# Patient Record
Sex: FEMALE | Race: WHITE | NOT HISPANIC OR LATINO | Employment: OTHER | URBAN - METROPOLITAN AREA
[De-identification: names, ages, dates, MRNs, and addresses within clinical notes are randomized per-mention and may not be internally consistent; named-entity substitution may affect disease eponyms.]

---

## 2017-07-01 ENCOUNTER — HOSPITAL ENCOUNTER (EMERGENCY)
Facility: HOSPITAL | Age: 82
Discharge: HOME | End: 2017-07-01
Attending: EMERGENCY MEDICINE
Payer: MEDICARE

## 2017-07-01 VITALS
RESPIRATION RATE: 18 BRPM | DIASTOLIC BLOOD PRESSURE: 100 MMHG | HEART RATE: 112 BPM | TEMPERATURE: 98 F | SYSTOLIC BLOOD PRESSURE: 227 MMHG | OXYGEN SATURATION: 95 %

## 2017-07-01 DIAGNOSIS — L40.9 PSORIASIS: Primary | ICD-10-CM

## 2017-07-01 PROCEDURE — 99283 EMERGENCY DEPT VISIT LOW MDM: CPT

## 2017-07-01 RX ORDER — CLINDAMYCIN HYDROCHLORIDE 150 MG/1
300 CAPSULE ORAL 4 TIMES DAILY
Qty: 56 CAPSULE | Refills: 0 | Status: SHIPPED | OUTPATIENT
Start: 2017-07-01 | End: 2017-07-08

## 2017-07-01 RX ORDER — TRIAMCINOLONE ACETONIDE 5 MG/G
OINTMENT TOPICAL 2 TIMES DAILY
Qty: 15 G | Refills: 0 | Status: ON HOLD | OUTPATIENT
Start: 2017-07-01 | End: 2020-01-14 | Stop reason: HOSPADM

## 2017-07-01 NOTE — ED NOTES
Pt reports swelling and psoriasis to Left hand that started yesterday, pt denies chest pain or sob, pt has psoriasis to lt palm and and swelling is noted to lt hand with redness, pt son in room with pt, pt awake alert in no acute distress

## 2017-07-01 NOTE — ED PROVIDER NOTES
Encounter Date: 7/1/2017       History     Chief Complaint   Patient presents with    Psoriasis     States onset with psoriasis to L palm and back of hand, with swelling since yesterday.      Patient presents with cracking and swelling to her left hand.  She does have a history of psoriasis of her hands.  She reports her left hand has a lot of scaly and now is mildly swollen and tender on the dorsal aspect.  She reports no fever chills nausea vomiting.  She has no pain in the hand but it itches.  No aggravating or relieving factors.  She has not been able to get in to see her dermatologist .              Review of patient's allergies indicates:   Allergen Reactions    Codeine Nausea Only    Darvon [propoxyphene] Nausea Only     Past Medical History:   Diagnosis Date    Diabetes mellitus     Hypertension      History reviewed. No pertinent surgical history.  History reviewed. No pertinent family history.  Social History   Substance Use Topics    Smoking status: Never Smoker    Smokeless tobacco: Never Used    Alcohol use Not on file     Review of Systems   Constitutional: Negative for fever.   HENT: Negative for sore throat.    Respiratory: Negative for shortness of breath.    Cardiovascular: Negative for chest pain.   Gastrointestinal: Negative for nausea.   Genitourinary: Negative for dysuria.   Musculoskeletal: Positive for arthralgias and joint swelling. Negative for back pain and gait problem.   Skin: Positive for rash.   Neurological: Negative for weakness.   Hematological: Does not bruise/bleed easily.       Physical Exam     Initial Vitals [07/01/17 1454]   BP Pulse Resp Temp SpO2   (!) 227/100 (!) 112 18 97.6 °F (36.4 °C) 95 %      MAP       142.33         Physical Exam    Nursing note and vitals reviewed.  Constitutional: She appears well-developed and well-nourished.   HENT:   Head: Normocephalic and atraumatic.   Eyes: Conjunctivae and EOM are normal.   Neck: Normal range of motion. Neck supple.    Cardiovascular: Normal rate, regular rhythm, normal heart sounds and intact distal pulses.   Pulmonary/Chest: Breath sounds normal. No respiratory distress. She has no wheezes. She has no rhonchi. She has no rales.   Abdominal: Soft. Bowel sounds are normal.   Musculoskeletal: Normal range of motion.   Neurological: She is alert and oriented to person, place, and time. She has normal strength.   Skin: Skin is warm and dry.   Patient with marked scaling and dryness to her left hand.  The dorsum of her hand is a little bit swollen and warm to touch.  There is no obvious abscess or fluctuance noted.  She has full range of motion of her digits. CR less than 2 seconds.   Psychiatric: She has a normal mood and affect. Thought content normal.         ED Course   Procedures  Labs Reviewed - No data to display          Medical Decision Making:   ED Management:  Patient reports her psoriasis has only gotten this bad twice in the past.  I will start antibiotics empirically I'm not sure position is a severe case of psoriasis versus a early infection with source from the cracks in her hands.  I will also place her on triamcinolone cream and psoriasin over-the-counter                   ED Course     Clinical Impression:   The encounter diagnosis was Psoriasis.    Disposition:   Disposition: Discharged  Condition: Stable                        Isabel Atkinson Do, MD  07/01/17 5746

## 2020-01-10 ENCOUNTER — HOSPITAL ENCOUNTER (INPATIENT)
Facility: HOSPITAL | Age: 85
LOS: 5 days | Discharge: NURSING, REHAB OR LONG-TERM CARE | DRG: 602 | End: 2020-01-15
Attending: EMERGENCY MEDICINE | Admitting: HOSPITALIST
Payer: MEDICARE

## 2020-01-10 DIAGNOSIS — L97.811: ICD-10-CM

## 2020-01-10 DIAGNOSIS — I83.018 VENOUS STASIS ULCER OF OTHER PART OF RIGHT LOWER LEG, UNSPECIFIED ULCER STAGE, UNSPECIFIED WHETHER VARICOSE VEINS PRESENT: Primary | ICD-10-CM

## 2020-01-10 DIAGNOSIS — L97.909 LEG ULCER: ICD-10-CM

## 2020-01-10 DIAGNOSIS — M25.561 RIGHT KNEE PAIN: ICD-10-CM

## 2020-01-10 DIAGNOSIS — R60.0 LOWER EXTREMITY EDEMA: ICD-10-CM

## 2020-01-10 DIAGNOSIS — I73.9 PAD (PERIPHERAL ARTERY DISEASE): ICD-10-CM

## 2020-01-10 DIAGNOSIS — L97.819 VENOUS STASIS ULCER OF OTHER PART OF RIGHT LOWER LEG, UNSPECIFIED ULCER STAGE, UNSPECIFIED WHETHER VARICOSE VEINS PRESENT: Primary | ICD-10-CM

## 2020-01-10 DIAGNOSIS — L03.115 CELLULITIS OF RIGHT LOWER LEG: ICD-10-CM

## 2020-01-10 DIAGNOSIS — E11.649 TYPE 2 DIABETES MELLITUS WITH HYPOGLYCEMIA WITHOUT COMA, WITHOUT LONG-TERM CURRENT USE OF INSULIN: ICD-10-CM

## 2020-01-10 DIAGNOSIS — I50.31 ACUTE DIASTOLIC HEART FAILURE: ICD-10-CM

## 2020-01-10 DIAGNOSIS — I10 HYPERTENSION: ICD-10-CM

## 2020-01-10 PROBLEM — E78.5 HLD (HYPERLIPIDEMIA): Status: ACTIVE | Noted: 2020-01-10

## 2020-01-10 PROBLEM — I83.009 VENOUS STASIS ULCER: Status: ACTIVE | Noted: 2020-01-10

## 2020-01-10 PROBLEM — N17.9 AKI (ACUTE KIDNEY INJURY): Status: ACTIVE | Noted: 2020-01-10

## 2020-01-10 PROBLEM — R53.81 DEBILITY: Status: ACTIVE | Noted: 2020-01-10

## 2020-01-10 PROBLEM — E87.5 HYPERKALEMIA: Status: ACTIVE | Noted: 2020-01-10

## 2020-01-10 PROBLEM — M15.9 PRIMARY OSTEOARTHRITIS INVOLVING MULTIPLE JOINTS: Status: ACTIVE | Noted: 2020-01-10

## 2020-01-10 PROBLEM — Z66 DNR (DO NOT RESUSCITATE): Status: ACTIVE | Noted: 2020-01-10

## 2020-01-10 PROBLEM — M17.11 OSTEOARTHRITIS OF RIGHT KNEE: Status: ACTIVE | Noted: 2020-01-10

## 2020-01-10 LAB
ALBUMIN SERPL BCP-MCNC: 3.5 G/DL (ref 3.5–5.2)
ALP SERPL-CCNC: 101 U/L (ref 55–135)
ALT SERPL W/O P-5'-P-CCNC: 18 U/L (ref 10–44)
ANION GAP SERPL CALC-SCNC: 10 MMOL/L (ref 8–16)
ANION GAP SERPL CALC-SCNC: 8 MMOL/L (ref 8–16)
AST SERPL-CCNC: 21 U/L (ref 10–40)
BACTERIA #/AREA URNS AUTO: NORMAL /HPF
BASOPHILS # BLD AUTO: 0.02 K/UL (ref 0–0.2)
BASOPHILS NFR BLD: 0.2 % (ref 0–1.9)
BILIRUB SERPL-MCNC: 0.3 MG/DL (ref 0.1–1)
BILIRUB UR QL STRIP: NEGATIVE
BNP SERPL-MCNC: 110 PG/ML (ref 0–99)
BUN SERPL-MCNC: 37 MG/DL (ref 6–30)
BUN SERPL-MCNC: 40 MG/DL (ref 10–30)
BUN SERPL-MCNC: 43 MG/DL (ref 10–30)
CALCIUM SERPL-MCNC: 9.3 MG/DL (ref 8.7–10.5)
CALCIUM SERPL-MCNC: 9.4 MG/DL (ref 8.7–10.5)
CHLORIDE SERPL-SCNC: 109 MMOL/L (ref 95–110)
CHLORIDE SERPL-SCNC: 110 MMOL/L (ref 95–110)
CHLORIDE SERPL-SCNC: 113 MMOL/L (ref 95–110)
CLARITY UR REFRACT.AUTO: CLEAR
CO2 SERPL-SCNC: 20 MMOL/L (ref 23–29)
CO2 SERPL-SCNC: 21 MMOL/L (ref 23–29)
COLOR UR AUTO: ABNORMAL
CREAT SERPL-MCNC: 1.5 MG/DL (ref 0.5–1.4)
CREAT SERPL-MCNC: 1.5 MG/DL (ref 0.5–1.4)
CREAT SERPL-MCNC: 1.6 MG/DL (ref 0.5–1.4)
CRP SERPL-MCNC: 5.6 MG/L (ref 0–8.2)
DIFFERENTIAL METHOD: ABNORMAL
EOSINOPHIL # BLD AUTO: 0.1 K/UL (ref 0–0.5)
EOSINOPHIL NFR BLD: 0.4 % (ref 0–8)
ERYTHROCYTE [DISTWIDTH] IN BLOOD BY AUTOMATED COUNT: 15.5 % (ref 11.5–14.5)
ERYTHROCYTE [SEDIMENTATION RATE] IN BLOOD BY WESTERGREN METHOD: 64 MM/HR (ref 0–36)
EST. GFR  (AFRICAN AMERICAN): 31.8 ML/MIN/1.73 M^2
EST. GFR  (AFRICAN AMERICAN): 34.4 ML/MIN/1.73 M^2
EST. GFR  (NON AFRICAN AMERICAN): 27.6 ML/MIN/1.73 M^2
EST. GFR  (NON AFRICAN AMERICAN): 29.8 ML/MIN/1.73 M^2
ESTIMATED AVG GLUCOSE: 123 MG/DL (ref 68–131)
GLUCOSE SERPL-MCNC: 72 MG/DL (ref 70–110)
GLUCOSE SERPL-MCNC: 79 MG/DL (ref 70–110)
GLUCOSE SERPL-MCNC: 90 MG/DL (ref 70–110)
GLUCOSE UR QL STRIP: NEGATIVE
HBA1C MFR BLD HPLC: 5.9 % (ref 4–5.6)
HCT VFR BLD AUTO: 37.4 % (ref 37–48.5)
HCT VFR BLD CALC: 33 %PCV (ref 36–54)
HGB BLD-MCNC: 11.3 G/DL (ref 12–16)
HGB UR QL STRIP: ABNORMAL
IMM GRANULOCYTES # BLD AUTO: 0.11 K/UL (ref 0–0.04)
IMM GRANULOCYTES NFR BLD AUTO: 0.8 % (ref 0–0.5)
KETONES UR QL STRIP: NEGATIVE
LACTATE SERPL-SCNC: 0.8 MMOL/L (ref 0.5–2.2)
LACTATE SERPL-SCNC: 0.9 MMOL/L (ref 0.5–2.2)
LEUKOCYTE ESTERASE UR QL STRIP: NEGATIVE
LYMPHOCYTES # BLD AUTO: 0.9 K/UL (ref 1–4.8)
LYMPHOCYTES NFR BLD: 6.9 % (ref 18–48)
MCH RBC QN AUTO: 31 PG (ref 27–31)
MCHC RBC AUTO-ENTMCNC: 30.2 G/DL (ref 32–36)
MCV RBC AUTO: 103 FL (ref 82–98)
MICROSCOPIC COMMENT: NORMAL
MONOCYTES # BLD AUTO: 0.6 K/UL (ref 0.3–1)
MONOCYTES NFR BLD: 4.8 % (ref 4–15)
NEUTROPHILS # BLD AUTO: 11.3 K/UL (ref 1.8–7.7)
NEUTROPHILS NFR BLD: 86.9 % (ref 38–73)
NITRITE UR QL STRIP: NEGATIVE
NRBC BLD-RTO: 0 /100 WBC
PH UR STRIP: 5 [PH] (ref 5–8)
PLATELET # BLD AUTO: 357 K/UL (ref 150–350)
PMV BLD AUTO: 9.2 FL (ref 9.2–12.9)
POC IONIZED CALCIUM: 1.13 MMOL/L (ref 1.06–1.42)
POC TCO2 (MEASURED): 19 MMOL/L (ref 23–29)
POCT GLUCOSE: 111 MG/DL (ref 70–110)
POCT GLUCOSE: 164 MG/DL (ref 70–110)
POCT GLUCOSE: 81 MG/DL (ref 70–110)
POTASSIUM BLD-SCNC: 5.5 MMOL/L (ref 3.5–5.1)
POTASSIUM SERPL-SCNC: 5.6 MMOL/L (ref 3.5–5.1)
POTASSIUM SERPL-SCNC: 5.7 MMOL/L (ref 3.5–5.1)
PROT SERPL-MCNC: 7.7 G/DL (ref 6–8.4)
PROT UR QL STRIP: NEGATIVE
RBC # BLD AUTO: 3.64 M/UL (ref 4–5.4)
RBC #/AREA URNS AUTO: 2 /HPF (ref 0–4)
SAMPLE: ABNORMAL
SODIUM BLD-SCNC: 139 MMOL/L (ref 136–145)
SODIUM SERPL-SCNC: 138 MMOL/L (ref 136–145)
SODIUM SERPL-SCNC: 140 MMOL/L (ref 136–145)
SP GR UR STRIP: 1.01 (ref 1–1.03)
URATE SERPL-MCNC: 6.3 MG/DL (ref 2.4–5.7)
URN SPEC COLLECT METH UR: ABNORMAL
WBC # BLD AUTO: 12.97 K/UL (ref 3.9–12.7)
WBC #/AREA URNS AUTO: 1 /HPF (ref 0–5)

## 2020-01-10 PROCEDURE — 83036 HEMOGLOBIN GLYCOSYLATED A1C: CPT

## 2020-01-10 PROCEDURE — 11000001 HC ACUTE MED/SURG PRIVATE ROOM

## 2020-01-10 PROCEDURE — 86140 C-REACTIVE PROTEIN: CPT

## 2020-01-10 PROCEDURE — 93005 ELECTROCARDIOGRAM TRACING: CPT

## 2020-01-10 PROCEDURE — 81001 URINALYSIS AUTO W/SCOPE: CPT

## 2020-01-10 PROCEDURE — 99223 PR INITIAL HOSPITAL CARE,LEVL III: ICD-10-PCS | Mod: AI,,, | Performed by: HOSPITALIST

## 2020-01-10 PROCEDURE — 83605 ASSAY OF LACTIC ACID: CPT | Mod: 91

## 2020-01-10 PROCEDURE — 82962 GLUCOSE BLOOD TEST: CPT

## 2020-01-10 PROCEDURE — 83880 ASSAY OF NATRIURETIC PEPTIDE: CPT

## 2020-01-10 PROCEDURE — 94761 N-INVAS EAR/PLS OXIMETRY MLT: CPT

## 2020-01-10 PROCEDURE — 80053 COMPREHEN METABOLIC PANEL: CPT

## 2020-01-10 PROCEDURE — 99285 EMERGENCY DEPT VISIT HI MDM: CPT | Mod: ,,, | Performed by: EMERGENCY MEDICINE

## 2020-01-10 PROCEDURE — 63600175 PHARM REV CODE 636 W HCPCS: Performed by: HOSPITALIST

## 2020-01-10 PROCEDURE — 85025 COMPLETE CBC W/AUTO DIFF WBC: CPT

## 2020-01-10 PROCEDURE — 25000003 PHARM REV CODE 250: Performed by: HOSPITALIST

## 2020-01-10 PROCEDURE — 84550 ASSAY OF BLOOD/URIC ACID: CPT

## 2020-01-10 PROCEDURE — 80048 BASIC METABOLIC PNL TOTAL CA: CPT

## 2020-01-10 PROCEDURE — 99285 EMERGENCY DEPT VISIT HI MDM: CPT | Mod: 25

## 2020-01-10 PROCEDURE — 63600175 PHARM REV CODE 636 W HCPCS: Performed by: PHYSICIAN ASSISTANT

## 2020-01-10 PROCEDURE — 25000003 PHARM REV CODE 250: Performed by: PHYSICIAN ASSISTANT

## 2020-01-10 PROCEDURE — 80047 BASIC METABLC PNL IONIZED CA: CPT

## 2020-01-10 PROCEDURE — 99223 1ST HOSP IP/OBS HIGH 75: CPT | Mod: AI,,, | Performed by: HOSPITALIST

## 2020-01-10 PROCEDURE — 96365 THER/PROPH/DIAG IV INF INIT: CPT | Mod: XS

## 2020-01-10 PROCEDURE — 87040 BLOOD CULTURE FOR BACTERIA: CPT

## 2020-01-10 PROCEDURE — 85652 RBC SED RATE AUTOMATED: CPT

## 2020-01-10 PROCEDURE — 93010 EKG 12-LEAD: ICD-10-PCS | Mod: ,,, | Performed by: INTERNAL MEDICINE

## 2020-01-10 PROCEDURE — 93010 ELECTROCARDIOGRAM REPORT: CPT | Mod: ,,, | Performed by: INTERNAL MEDICINE

## 2020-01-10 PROCEDURE — 99285 PR EMERGENCY DEPT VISIT,LEVEL V: ICD-10-PCS | Mod: ,,, | Performed by: EMERGENCY MEDICINE

## 2020-01-10 RX ORDER — SODIUM CHLORIDE 0.9 % (FLUSH) 0.9 %
10 SYRINGE (ML) INJECTION
Status: DISCONTINUED | OUTPATIENT
Start: 2020-01-10 | End: 2020-01-15 | Stop reason: HOSPADM

## 2020-01-10 RX ORDER — GLUCAGON 1 MG
1 KIT INJECTION
Status: DISCONTINUED | OUTPATIENT
Start: 2020-01-10 | End: 2020-01-15 | Stop reason: HOSPADM

## 2020-01-10 RX ORDER — LORAZEPAM 0.5 MG/1
0.5 TABLET ORAL ONCE
Status: COMPLETED | OUTPATIENT
Start: 2020-01-10 | End: 2020-01-10

## 2020-01-10 RX ORDER — ONDANSETRON 8 MG/1
8 TABLET, ORALLY DISINTEGRATING ORAL EVERY 8 HOURS PRN
Status: DISCONTINUED | OUTPATIENT
Start: 2020-01-10 | End: 2020-01-15 | Stop reason: HOSPADM

## 2020-01-10 RX ORDER — IBUPROFEN 200 MG
24 TABLET ORAL
Status: DISCONTINUED | OUTPATIENT
Start: 2020-01-10 | End: 2020-01-15 | Stop reason: HOSPADM

## 2020-01-10 RX ORDER — HEPARIN SODIUM 5000 [USP'U]/ML
5000 INJECTION, SOLUTION INTRAVENOUS; SUBCUTANEOUS EVERY 12 HOURS
Status: DISCONTINUED | OUTPATIENT
Start: 2020-01-10 | End: 2020-01-15 | Stop reason: HOSPADM

## 2020-01-10 RX ORDER — AMLODIPINE BESYLATE 5 MG/1
5 TABLET ORAL DAILY
Status: DISCONTINUED | OUTPATIENT
Start: 2020-01-10 | End: 2020-01-12

## 2020-01-10 RX ORDER — INSULIN ASPART 100 [IU]/ML
0-5 INJECTION, SOLUTION INTRAVENOUS; SUBCUTANEOUS
Status: DISCONTINUED | OUTPATIENT
Start: 2020-01-10 | End: 2020-01-15 | Stop reason: HOSPADM

## 2020-01-10 RX ORDER — TALC
6 POWDER (GRAM) TOPICAL NIGHTLY PRN
Status: DISCONTINUED | OUTPATIENT
Start: 2020-01-10 | End: 2020-01-15 | Stop reason: HOSPADM

## 2020-01-10 RX ORDER — ACETAMINOPHEN 325 MG/1
650 TABLET ORAL EVERY 4 HOURS PRN
Status: DISCONTINUED | OUTPATIENT
Start: 2020-01-10 | End: 2020-01-15 | Stop reason: HOSPADM

## 2020-01-10 RX ORDER — ACETAMINOPHEN 500 MG
1000 TABLET ORAL EVERY 8 HOURS PRN
Status: DISCONTINUED | OUTPATIENT
Start: 2020-01-10 | End: 2020-01-15 | Stop reason: HOSPADM

## 2020-01-10 RX ORDER — SIMVASTATIN 10 MG/1
10 TABLET, FILM COATED ORAL NIGHTLY
Status: DISCONTINUED | OUTPATIENT
Start: 2020-01-10 | End: 2020-01-10

## 2020-01-10 RX ORDER — ACETAMINOPHEN 325 MG/1
650 TABLET ORAL
Status: COMPLETED | OUTPATIENT
Start: 2020-01-10 | End: 2020-01-10

## 2020-01-10 RX ORDER — SIMVASTATIN 20 MG/1
20 TABLET, FILM COATED ORAL NIGHTLY
Status: DISCONTINUED | OUTPATIENT
Start: 2020-01-10 | End: 2020-01-15 | Stop reason: HOSPADM

## 2020-01-10 RX ORDER — IBUPROFEN 200 MG
16 TABLET ORAL
Status: DISCONTINUED | OUTPATIENT
Start: 2020-01-10 | End: 2020-01-15 | Stop reason: HOSPADM

## 2020-01-10 RX ORDER — FUROSEMIDE 10 MG/ML
40 INJECTION INTRAMUSCULAR; INTRAVENOUS ONCE
Status: COMPLETED | OUTPATIENT
Start: 2020-01-10 | End: 2020-01-10

## 2020-01-10 RX ORDER — PROMETHAZINE HYDROCHLORIDE 25 MG/1
25 TABLET ORAL EVERY 6 HOURS PRN
Status: DISCONTINUED | OUTPATIENT
Start: 2020-01-10 | End: 2020-01-15 | Stop reason: HOSPADM

## 2020-01-10 RX ADMIN — FUROSEMIDE 40 MG: 10 INJECTION, SOLUTION INTRAMUSCULAR; INTRAVENOUS at 01:01

## 2020-01-10 RX ADMIN — PIPERACILLIN AND TAZOBACTAM 4.5 G: 4; .5 INJECTION, POWDER, LYOPHILIZED, FOR SOLUTION INTRAVENOUS; PARENTERAL at 11:01

## 2020-01-10 RX ADMIN — ACETAMINOPHEN 1000 MG: 500 TABLET ORAL at 05:01

## 2020-01-10 RX ADMIN — AMLODIPINE BESYLATE 5 MG: 5 TABLET ORAL at 01:01

## 2020-01-10 RX ADMIN — SIMVASTATIN 20 MG: 20 TABLET, FILM COATED ORAL at 09:01

## 2020-01-10 RX ADMIN — SODIUM CHLORIDE 500 ML: 0.9 INJECTION, SOLUTION INTRAVENOUS at 11:01

## 2020-01-10 RX ADMIN — LORAZEPAM 0.5 MG: 0.5 TABLET ORAL at 02:01

## 2020-01-10 RX ADMIN — HEPARIN SODIUM 5000 UNITS: 5000 INJECTION, SOLUTION INTRAVENOUS; SUBCUTANEOUS at 09:01

## 2020-01-10 RX ADMIN — Medication 6 MG: at 09:01

## 2020-01-10 RX ADMIN — ACETAMINOPHEN 650 MG: 325 TABLET ORAL at 11:01

## 2020-01-10 RX ADMIN — VANCOMYCIN HYDROCHLORIDE 1250 MG: 1.25 INJECTION, POWDER, LYOPHILIZED, FOR SOLUTION INTRAVENOUS at 11:01

## 2020-01-10 NOTE — ASSESSMENT & PLAN NOTE
- had been started on lasix 20mg daily for bilateral lower extremity edema  - likely 2/2 chronic venous stasis but will check BNP  - no TTE on file

## 2020-01-10 NOTE — SUBJECTIVE & OBJECTIVE
"Past Medical History:   Diagnosis Date    Diabetes mellitus     Hypertension        Past Surgical History:   Procedure Laterality Date    MASTECTOMY Left        Review of patient's allergies indicates:   Allergen Reactions    Codeine Nausea Only    Darvon [propoxyphene] Nausea Only       Current Facility-Administered Medications   Medication    acetaminophen tablet 1,000 mg    acetaminophen tablet 650 mg    amLODIPine tablet 5 mg    dextrose 10% (D10W) Bolus    dextrose 10% (D10W) Bolus    glucagon (human recombinant) injection 1 mg    glucose chewable tablet 16 g    glucose chewable tablet 24 g    heparin (porcine) injection 5,000 Units    insulin aspart U-100 pen 0-5 Units    melatonin tablet 6 mg    ondansetron disintegrating tablet 8 mg    promethazine tablet 25 mg    simvastatin tablet 20 mg    sodium chloride 0.9% flush 10 mL    [START ON 1/11/2020] vancomycin (VANCOCIN) 500 mg in dextrose 5 % 100 mL IVPB    vancomycin - pharmacy to dose     Family History     None        Tobacco Use    Smoking status: Never Smoker    Smokeless tobacco: Never Used   Substance and Sexual Activity    Alcohol use: Not Currently    Drug use: Never    Sexual activity: Not Currently     ROS   Per ER 1/10/20    Objective:     Vital Signs (Most Recent):  Temp: 97.7 °F (36.5 °C) (01/10/20 1629)  Pulse: 82 (01/10/20 1629)  Resp: 18 (01/10/20 1629)  BP: (!) 175/73 (01/10/20 1629)  SpO2: 95 % (01/10/20 1629) Vital Signs (24h Range):  Temp:  [97.4 °F (36.3 °C)-97.7 °F (36.5 °C)] 97.7 °F (36.5 °C)  Pulse:  [81-94] 82  Resp:  [16-18] 18  SpO2:  [94 %-100 %] 95 %  BP: (132-228)/() 175/73     Weight: 63.5 kg (140 lb)  Height: 5' 6" (167.6 cm)  Body mass index is 22.6 kg/m².      Intake/Output Summary (Last 24 hours) at 1/10/2020 9635  Last data filed at 1/10/2020 1242  Gross per 24 hour   Intake 600 ml   Output --   Net 600 ml       Ortho/SPM Exam   Gen:  No acute distress  CV:  Peripherally well-perfused.  "   Lungs:  Normal respiratory effort.  Head/Neck:  Normocephalic.  Atraumatic.    MSK:  LLE:  - 2 anterior tibia superficial skin ulcers covered w/out drainage.  - Swelling distal to calf w/ signficant erythema and increased cap refill. Mildly TTP throughout lower leg distal to erythema.   - AROM and PROM intact, but knee ROM limited to  deg.   - TA/EHL/Gastroc/FHL assessed in isolation without deficit  - SILT, but decreased inferior to knee.   - Compartments soft  - DP and PT palpated  2+  - Capillary Refill <3s    RLE:  - Anterior tibial vertical superficial skin ulcer and posteromedial calf with 4cm diameter superficial skin ulcer covered w/out drainage.   - Swelling distal to calf w/ signficant erythema and increased cap refill. Mildly TTP throughout lower leg distal to erythema.   - AROM and PROM intact, but knee ROM limited to 70-90 deg  - TA/EHL/Gastroc/FHL assessed in isolation without deficit  - SILT, but decreased inferior to knee.   - Compartments soft  - DP and PT palpated  2+  - Capillary Refill <3s    All other joints (shoulder/elbow/wrist/hip/knee/ankle) were examined and had full ROM and were non-tender to palpation.    Significant Labs: All pertinent labs within the past 24 hours have been reviewed.    Significant Imaging: X-Ray: I have reviewed all pertinent results/findings and my personal findings are:    Right knee Xray : Severe chronic OA, no acute fractures or dislocations.  Right Tib/Fib Xray: As above, otherwise no acute fractures or dislocations.

## 2020-01-10 NOTE — ED NOTES
Provided patient with apple juice and snacks. Continuous pulse oximetry, BP, and cardiac monitoring in place. Call bell within reach. Family at the bedside. Will continue to monitor.

## 2020-01-10 NOTE — ASSESSMENT & PLAN NOTE
- hypertensive on admission  - holding home ARB due to MARY  - likely driven by pain and MARY  - add on amlodipine 5; can consider prn hydralazine if necessary to get patient to bed although would favor conservative treatment with pain control and infection treatment

## 2020-01-10 NOTE — PROGRESS NOTES
Pharmacokinetic Initial Assessment: IV Vancomycin    Assessment/Plan:    Initiate intravenous vancomycin with loading dose of 1250 mg once followed by a maintenance dose of vancomycin 500 mg IV every 24 hours  Desired empiric serum trough concentration is 10 to 15 mcg/mL  Draw vancomycin trough level 30 min prior to third dose on 1/12/20 at approximately 1100   Pharmacy will continue to follow and monitor vancomycin.      Please contact pharmacy at extension [phone] with any questions regarding this assessment.     Thank you for the consult,   Jeanette Hunt       Patient brief summary:  Debbie Dietz is a 89 y.o. female initiated on antimicrobial therapy with IV Vancomycin for treatment of suspected skin & soft tissue infection    Drug Allergies:   Review of patient's allergies indicates:   Allergen Reactions    Codeine Nausea Only    Darvon [propoxyphene] Nausea Only       Actual Body Weight:   63.5 kg    Renal Function:   Estimated Creatinine Clearance: 21.9 mL/min (A) (based on SCr of 1.5 mg/dL (H)).,     Dialysis Method (if applicable):  N/A    CBC (last 72 hours):  Recent Labs   Lab Result Units 01/10/20  1020   WBC K/uL 12.97*   Hemoglobin g/dL 11.3*   Hematocrit % 37.4   Platelets K/uL 357*   Gran% % 86.9*   Lymph% % 6.9*   Mono% % 4.8   Eosinophil% % 0.4   Basophil% % 0.2   Differential Method  Automated       Metabolic Panel (last 72 hours):  Recent Labs   Lab Result Units 01/10/20  1020 01/10/20  1243   Sodium mmol/L 138  --    Potassium mmol/L 5.7*  --    Chloride mmol/L 110  --    CO2 mmol/L 20*  --    Glucose mg/dL 90  --    Glucose, UA   --  Negative   BUN, Bld mg/dL 43*  --    Creatinine mg/dL 1.5*  --    Albumin g/dL 3.5  --    Total Bilirubin mg/dL 0.3  --    Alkaline Phosphatase U/L 101  --    AST U/L 21  --    ALT U/L 18  --        Drug levels (last 3 results):  No results for input(s): VANCOMYCINRA, VANCOMYCINPE, VANCOMYCINTR in the last 72 hours.    Microbiologic Results:  Microbiology Results  (last 7 days)       Procedure Component Value Units Date/Time    Blood culture x two cultures. Draw prior to antibiotics. [[id number]] Collected:  01/10/20 1135    Order Status:  Sent Specimen:  Blood from Peripheral, Antecubital, Right Updated:  01/10/20 1149    Blood culture x two cultures. Draw prior to antibiotics. [[id number]] Collected:  01/10/20 1135    Order Status:  Sent Specimen:  Blood from Peripheral, Antecubital, Left Updated:  01/10/20 1148

## 2020-01-10 NOTE — ASSESSMENT & PLAN NOTE
- unknown baseline but Cr 1.5 on admission with CrCl 21  - suspect due to infection + ARB + NSAID use + HTN; less likely prerenal due to recent lasix initiation  - given 500cc NS in ED  - hold ARB and NSAIDs for now  - repeat BMP in am

## 2020-01-10 NOTE — HPI
Debbie Dietz is a 89 y.o. female with PMH of DM and HTN presents with R knee pain and swelling. Patient had slurred speech and generalized weakness earlier today and was found to be hypoglycemic (BG 59) for which BG and slurred speech has improved. Patient denies any head trauma or LOC. The patient denies prior hx of falls. Patient denies numbness and tingling.  Pt reports chronic progressively worsening pain and flexion contracture of right knee. No acute changes in pain or deformity.

## 2020-01-10 NOTE — ASSESSMENT & PLAN NOTE
Debbie Dietz is a 89 y.o. female with chronic severe right knee OA with a significant flexion contracture likely 2/2 to her disease and decreased mobility.   - Recc NSAIDs, rest, ice, elevation PRN for symptom control  - Recc PT/OT while inpatient   - WBAT BLE  - no acute ortho intervention at this time, will sign off.

## 2020-01-10 NOTE — H&P
[facility] Hospital Medicine  History & Physical    Patient Name: Debbie Dietz  MRN: [id number]  Admission Date: 1/10/2020  Attending Physician: Javier Carrillo MD  Primary Care Provider: No primary care provider on file.    Hospital Medicine Team: [facility] HOSP MED A Javier Carrillo MD     Patient information was obtained from patient, relative(s), EMS personnel and ER records.     Subjective:     Principal Problem:Type 2 diabetes mellitus with hypoglycemia, without long-term current use of insulin    Chief Complaint:   Chief Complaint   Patient presents with    Hypoglycemia     slurred speech, CBG was 59 at home, EMS gave D50 and CBG is now 129, slurred speech resolved.         HPI: Ms. Dietz is a 89yo woman with HTN, DM2 not on insulin, and LE ulcer who presents with episode of hypoglycemia and slurred speech. She states that she developed bilateral leg swelling and venous stasis ulcers shortly after Thanksgiving. She was set up with home health and wound care with some improvement. 2 weeks ago, she had increase in bilateral swelling and was started on PO lasix. She notes that over the past week, she has started to have bilateral redness to the legs, as well as increased pain of right LE ulcer. She notes that over the past week, she feels like her glucose has been dropping occasionally, with episodes of shaking and feeling fatigued, which improve with eating. On evening prior to admission, she was having increased right leg pain and took an advil to help her sleep. On morning of admit, she felt generalized weakness and had slurred speech. She was able to contact her daughter with phone by bedside and EMS was called. Glucose found to be 59; she was given D50 with complete resolution of symptoms.    In the ED, she was afebrile with HR 90 and /110. Found to have WBC 12.9 with K 5.7 and Cr 1.5.    Past Medical History:   Diagnosis Date    Diabetes mellitus     Hypertension         Past Surgical History:   Procedure Laterality Date    MASTECTOMY Left        Review of patient's allergies indicates:   Allergen Reactions    Codeine Nausea Only    Darvon [propoxyphene] Nausea Only       No current facility-administered medications on file prior to encounter.      Current Outpatient Medications on File Prior to Encounter   Medication Sig    CEPHALEXIN ORAL Take by mouth.    IRBESARTAN ORAL Take by mouth.    SIMVASTATIN ORAL Take by mouth.    triamcinolone (KENALOG) 0.5 % ointment Apply topically 2 (two) times daily.    [DISCONTINUED] GLIMEPIRIDE ORAL Take by mouth.     Family History     None        Tobacco Use    Smoking status: Never Smoker    Smokeless tobacco: Never Used   Substance and Sexual Activity    Alcohol use: Not on file    Drug use: Not on file    Sexual activity: Not on file     Review of Systems   Constitutional: Positive for chills and fatigue. Negative for diaphoresis and fever.   HENT: Negative for congestion and sore throat.    Eyes: Negative for discharge and visual disturbance.   Respiratory: Negative for cough and shortness of breath.    Cardiovascular: Positive for leg swelling. Negative for chest pain.   Gastrointestinal: Negative for abdominal pain, nausea and vomiting.   Genitourinary: Negative for difficulty urinating.   Musculoskeletal: Positive for arthralgias. Negative for joint swelling.   Skin: Positive for rash and wound.   Allergic/Immunologic: Negative for immunocompromised state.   Neurological: Positive for tremors (with hypoglycemia), speech difficulty and weakness. Negative for light-headedness and headaches.   Psychiatric/Behavioral: Negative for agitation and confusion.     Objective:     Vital Signs (Most Recent):  Temp: 97.4 °F (36.3 °C) (01/10/20 1001)  Pulse: 81 (01/10/20 1241)  Resp: 16 (01/10/20 1001)  BP: (!) 181/74 (01/10/20 1241)  SpO2: 99 % (01/10/20 1241) Vital Signs (24h Range):  Temp:  [97.4 °F (36.3 °C)] 97.4 °F (36.3  °C)  Pulse:  [81-91] 81  Resp:  [16] 16  SpO2:  [94 %-100 %] 99 %  BP: (160-228)/() 181/74     Weight: 63.5 kg (140 lb)  Body mass index is 22.6 kg/m².    Physical Exam   Constitutional: She is oriented to person, place, and time. She appears well-developed and well-nourished. No distress.   Frail   HENT:   Head: Normocephalic and atraumatic.   Nose: Nose normal.   Eyes: Pupils are equal, round, and reactive to light. EOM are normal. No scleral icterus.   Neck: Normal range of motion. No JVD present. No tracheal deviation present.   Cardiovascular: Normal rate, regular rhythm and normal heart sounds.   Pulmonary/Chest: Effort normal and breath sounds normal. No respiratory distress.   Abdominal: Soft. She exhibits no distension. There is no tenderness.   Musculoskeletal: She exhibits edema (bilateral 2+) and tenderness. She exhibits no deformity.   Bilateral legs bandaged; see photos in ED note for ulcer picture   Neurological: She is alert and oriented to person, place, and time.   Skin: Skin is warm and dry. Rash noted. She is not diaphoretic. There is erythema (bilateral, blanching).   Psychiatric: She has a normal mood and affect. Her behavior is normal.   Vitals reviewed.        CRANIAL NERVES     CN III, IV, VI   Pupils are equal, round, and reactive to light.  Extraocular motions are normal.        Significant Labs: All pertinent labs within the past 24 hours have been reviewed.    Significant Imaging: I have reviewed all pertinent imaging results/findings within the past 24 hours.    Assessment/Plan:     * Type 2 diabetes mellitus with hypoglycemia, without long-term current use of insulin  - no A1c on file; will check  - on glimepiride at home; suspect developed hypoglycemia in the setting of infection with MARY due to infection/ARB/NSAID use  - LDSSI with accuchecks qachs while in house  - long term would likely benefit from alternate medication with less risk of hypoglycemia    Cellulitis of right  lower extremity  - bilateral blanching erythema with leukocytosis and venous stasis ulcer of RLE  - given 500cc NS in ED  - initiated on vancomycin and zosyn in ED, will continue vanc for now  - can likely transition to PO antibiotics tomorrow if improving  - Wound Care consult      MARY (acute kidney injury)  - unknown baseline but Cr 1.5 on admission with CrCl 21  - suspect due to infection + ARB + NSAID use + HTN; less likely prerenal due to recent lasix initiation  - given 500cc NS in ED  - hold ARB and NSAIDs for now  - repeat BMP in am      Hyperkalemia  - K 5.7 on admission and 5.5 on repeat; EKG w/o changes  - suspect 2/2 MARY due to infection/ARB/NSAID use  - will give lasix 40mg IV x1 and repeat BMP at 6pm (ordered)  - defer shift with insulin given hypoglycemia on admit      Bilateral leg edema  - had been started on lasix 20mg daily for bilateral lower extremity edema  - likely 2/2 chronic venous stasis but will check BNP  - no TTE on file      HLD (hyperlipidemia)  - stable  - continue home simvastatin      Primary osteoarthritis involving multiple joints  - worst in bilateral knee joints; patient has been using wheelchair due to pain  - uses prn APAP with rare advil usage at home  - continue prn APAP      DNR (do not resuscitate)  - code discussion with patient and family at bedside; patient is DNR      Debility  - 2/2 severe bilateral arthritis, worse on right  - PT/OT      Essential hypertension  - hypertensive on admission  - holding home ARB due to MARY  - likely driven by pain and MARY  - add on amlodipine 5; can consider prn hydralazine if necessary to get patient to bed although would favor conservative treatment with pain control and infection treatment      Venous stasis ulcer  - concern for superimposed infection  - Wound Care consulted  - APAP prn pain        VTE Risk Mitigation (From admission, onward)         Ordered     heparin (porcine) injection 5,000 Units  Every 12 hours      01/10/20  1318     IP VTE HIGH RISK PATIENT  Once      01/10/20 1318                   Javier Carrillo MD  Department of Hospital Medicine   [facility]

## 2020-01-10 NOTE — ED TRIAGE NOTES
Pt presents to the ED via EMS c/o slurred speech and generalized weakness. CBG was initially 59 upon EMS arrival. 5g D50 given, CBG then 129. Now 111. Pt states she believes her speech sounds better now.

## 2020-01-10 NOTE — ASSESSMENT & PLAN NOTE
- bilateral blanching erythema with leukocytosis and venous stasis ulcer of RLE  - given 500cc NS in ED  - initiated on vancomycin and zosyn in ED, will continue vanc for now  - can likely transition to PO antibiotics tomorrow if improving  - Wound Care consult

## 2020-01-10 NOTE — ED PROVIDER NOTES
Encounter Date: 1/10/2020       History     Chief Complaint   Patient presents with    Hypoglycemia     slurred speech, CBG was 59 at home, EMS gave D50 and CBG is now 129, slurred speech resolved.      89-year-old female with history of diabetes, hypertension, presents to the ER by EMS due to low blood glucose of 59 this morning.  Patient reports that she has been feeling shaky, like my blood sugar is low close , for 1 week.  She reports decreased appetite recently, but has been eating and drinking.  Patient's daughter observed that patient had slurred speech this morning so EMS was called.  Patient was given D 50 IV by EMS and her speech has returned to normal. Patient denies headache, dizziness, weakness or numbness of the extremities.  Patient last took her glimepiride yesterday afternoon and last dose of blood pressure medication was at 3:00 p.m. yesterday.  She denies chest pain, shortness of breath, abdominal pain, nausea, vomiting.  Patient had home health care change lower leg dressing yesterday.  Patient has venous stasis ulcers to bilateral lower leg.  They note that her leg swelling increased 2 weeks ago and she was started on Lasix 1 week ago.  They also note increasing pain and redness to the bilateral lower legs over the last 2 weeks.  Home healthcare nurse did not find that the legs look infected yesterday and she is not currently on antibiotics.  Patient and her daughter deny additional complaints at this time.        Review of patient's allergies indicates:   Allergen Reactions    Codeine Nausea Only    Darvon [propoxyphene] Nausea Only     Past Medical History:   Diagnosis Date    Diabetes mellitus     Hypertension      Past Surgical History:   Procedure Laterality Date    MASTECTOMY Left      History reviewed. No pertinent family history.  Social History     Tobacco Use    Smoking status: Never Smoker    Smokeless tobacco: Never Used   Substance Use Topics    Alcohol use: Not on file     Drug use: Not on file     Review of Systems   Constitutional: Negative for chills and fever.   HENT: Negative for sore throat.    Respiratory: Negative for cough and shortness of breath.    Cardiovascular: Negative for chest pain.   Gastrointestinal: Negative for nausea and vomiting.   Genitourinary: Negative for dysuria.   Musculoskeletal: Positive for arthralgias and joint swelling. Negative for back pain and myalgias.   Skin: Positive for color change and wound. Negative for rash.   Neurological: Positive for speech difficulty (resolved). Negative for dizziness, weakness and light-headedness.   Hematological: Does not bruise/bleed easily.   Psychiatric/Behavioral: Negative for confusion.       Physical Exam     Initial Vitals [01/10/20 1001]   BP Pulse Resp Temp SpO2   (!) 160/110 90 16 97.4 °F (36.3 °C) 99 %      MAP       --         Physical Exam    Nursing note and vitals reviewed.  Constitutional: She appears well-developed and well-nourished.   HENT:   Head: Atraumatic.   Mouth/Throat: Oropharynx is clear and moist.   Eyes: Conjunctivae and EOM are normal. Pupils are equal, round, and reactive to light.   Neck: Normal range of motion. Neck supple.   Cardiovascular: Normal rate, regular rhythm and intact distal pulses.   Pulmonary/Chest: Breath sounds normal. No respiratory distress. She has no wheezes. She has no rhonchi. She has no rales.   Abdominal: Soft. Bowel sounds are normal. There is no tenderness.   Musculoskeletal:        Right knee: She exhibits decreased range of motion (unable to extend knee beyond 30 degrees secondary to pain and stiffness). She exhibits no effusion, no erythema, no LCL laxity and no MCL laxity. Tenderness found.   Neurological: She is alert and oriented to person, place, and time. She has normal strength.   Skin: No rash noted. There is erythema ( cellulitis right lower leg and foot. additional 2.5 cm area of scabbing to right medial lower leg.  no active drainage. ).         Psychiatric: She has a normal mood and affect.             ED Course   Procedures  Labs Reviewed   CBC W/ AUTO DIFFERENTIAL - Abnormal; Notable for the following components:       Result Value    WBC 12.97 (*)     RBC 3.64 (*)     Hemoglobin 11.3 (*)     Mean Corpuscular Volume 103 (*)     Mean Corpuscular Hemoglobin Conc 30.2 (*)     RDW 15.5 (*)     Platelets 357 (*)     Immature Granulocytes 0.8 (*)     Gran # (ANC) 11.3 (*)     Immature Grans (Abs) 0.11 (*)     Lymph # 0.9 (*)     Gran% 86.9 (*)     Lymph% 6.9 (*)     All other components within normal limits   COMPREHENSIVE METABOLIC PANEL - Abnormal; Notable for the following components:    Potassium 5.7 (*)     CO2 20 (*)     BUN, Bld 43 (*)     Creatinine 1.5 (*)     eGFR if  34.4 (*)     eGFR if non  29.8 (*)     All other components within normal limits   SEDIMENTATION RATE - Abnormal; Notable for the following components:    Sed Rate 64 (*)     All other components within normal limits   URIC ACID - Abnormal; Notable for the following components:    Uric Acid 6.3 (*)     All other components within normal limits    Narrative:     ADD ON URIC [id number] PER ZACHARY WILLOUGHBY MD  01/10/2020  12:43    POCT GLUCOSE - Abnormal; Notable for the following components:    POCT Glucose 111 (*)     All other components within normal limits   ISTAT PROCEDURE - Abnormal; Notable for the following components:    POC BUN 37 (*)     POC Creatinine 1.5 (*)     POC Potassium 5.5 (*)     POC Chloride 113 (*)     POC TCO2 (MEASURED) 19 (*)     POC Hematocrit 33 (*)     All other components within normal limits   CULTURE, BLOOD   CULTURE, BLOOD   LACTIC ACID, PLASMA   C-REACTIVE PROTEIN   URIC ACID   B-TYPE NATRIURETIC PEPTIDE   HEMOGLOBIN A1C   LACTIC ACID, PLASMA   URINALYSIS, REFLEX TO URINE CULTURE   URINALYSIS MICROSCOPIC   ISTAT CHEM8          Imaging Results    None                APC / Resident Notes:   Patient presents to the ER by  EMS due to hypoglycemia and slurred speech noted at home this morning.  Patient's slurred speech resolved on arrival after receiving D50 IV.  Patient is alert and oriented x3.  She is complaining of right knee pain, which is chronic but worse over the last 1 and half months.  Will have Orthopedics evaluate due to significant decreased ROM making it difficult to ambulate and perform ADL at home.   Patient is being treated by her primary care nurse practitioner and home health for bilateral lower leg ulcers, which have been present for 4 months.  Her right lower leg ulcer appears infected today with surrounding cellulitis.  Will admit for IV antibiotics.  She is given vancomycin and Zosyn in the ED.  Patient is afebrile, without tachycardia.  She does not meet SIRS criteria at this time.  Will send blood cultures.  Wounds are cleansed and dressed.  I have low suspicion for osteomyelitis at this time, will order xrays and ESR.   Patient is given Tylenol for pain control.  Blood work reveals mildly elevated WBC count.  Creatinine of 1.4 without baseline labs.  She is given 500 cc IV fluids in the ED, will continue to hydrate.   Patient's potassium is elevated to 5.7.  EKG shows normal sinus rhythm, rate of 76, no evidence of acute ischemia, no evidence of peaked T-waves.  Will repeat potassium. Repeat is 5.5, will continue to monitor.  Patient was taking multivitamins at home this week.    I discussed the care this patient supervising physician.    Patient and family are in agreement with plan for admission.    I discussed the care this patient with my supervising physician.  I discussed patient's presentation and admission with Dr. Carrillo, hospitalist.  He has agreed to admit the patient.                  ED Course as of Davion 10 1328   Fri Davion 10, 2020   1208 Orthopedics in room with patient for evaluation    [LH]   1208 Potassium of 5.7 without visible hemolysis.  EKG shows NSR rate 76.  No evidence of peaked T  waves, will repeat K.     [LH]      ED Course User Index  [LH] RADHA Sims                Clinical Impression:       ICD-10-CM ICD-9-CM   1. Venous stasis ulcer of other part of right lower leg, unspecified ulcer stage, unspecified whether varicose veins present I83.018 454.0    L97.819    2. Hypertension I10 401.9   3. Leg ulcer L97.909 707.10   4. Right knee pain M25.561 719.46   5. Cellulitis of right lower leg L03.115 682.6                             RADHA Sims  01/10/20 1328

## 2020-01-10 NOTE — CONSULTS
[facility]  Orthopedics  Consult Note    Patient Name: Debbie Dietz  MRN: [id number]  Admission Date: 1/10/2020  Hospital Length of Stay: 0 days  Attending Provider: Javier Carrillo, *  Primary Care Provider: No primary care provider on file.    Inpatient consult to Orthopedic Surgery  Consult performed by: Chris Suero MD  Consult ordered by: RADHA Sims        Subjective:     Principal Problem:Type 2 diabetes mellitus with hypoglycemia, without long-term current use of insulin    Chief Complaint:   Chief Complaint   Patient presents with    Hypoglycemia     slurred speech, CBG was 59 at home, EMS gave D50 and CBG is now 129, slurred speech resolved.         HPI: Debbie Dietz is a 89 y.o. female with PMH of DM and HTN presents with R knee pain and swelling. Patient had slurred speech and generalized weakness earlier today and was found to be hypoglycemic (BG 59) for which BG and slurred speech has improved. Patient denies any head trauma or LOC. The patient denies prior hx of falls. Patient denies numbness and tingling.  Pt reports chronic progressively worsening pain and flexion contracture of right knee. No acute changes in pain or deformity.     Past Medical History:   Diagnosis Date    Diabetes mellitus     Hypertension        Past Surgical History:   Procedure Laterality Date    MASTECTOMY Left        Review of patient's allergies indicates:   Allergen Reactions    Codeine Nausea Only    Darvon [propoxyphene] Nausea Only       Current Facility-Administered Medications   Medication    acetaminophen tablet 1,000 mg    acetaminophen tablet 650 mg    amLODIPine tablet 5 mg    dextrose 10% (D10W) Bolus    dextrose 10% (D10W) Bolus    glucagon (human recombinant) injection 1 mg    glucose chewable tablet 16 g    glucose chewable tablet 24 g    heparin (porcine) injection 5,000 Units    insulin aspart U-100 pen 0-5 Units    melatonin tablet 6 mg     "ondansetron disintegrating tablet 8 mg    promethazine tablet 25 mg    simvastatin tablet 20 mg    sodium chloride 0.9% flush 10 mL    [START ON 1/11/2020] vancomycin (VANCOCIN) 500 mg in dextrose 5 % 100 mL IVPB    vancomycin - pharmacy to dose     Family History     None        Tobacco Use    Smoking status: Never Smoker    Smokeless tobacco: Never Used   Substance and Sexual Activity    Alcohol use: Not Currently    Drug use: Never    Sexual activity: Not Currently     ROS   Per ER 1/10/20    Objective:     Vital Signs (Most Recent):  Temp: 97.7 °F (36.5 °C) (01/10/20 1629)  Pulse: 82 (01/10/20 1629)  Resp: 18 (01/10/20 1629)  BP: (!) 175/73 (01/10/20 1629)  SpO2: 95 % (01/10/20 1629) Vital Signs (24h Range):  Temp:  [97.4 °F (36.3 °C)-97.7 °F (36.5 °C)] 97.7 °F (36.5 °C)  Pulse:  [81-94] 82  Resp:  [16-18] 18  SpO2:  [94 %-100 %] 95 %  BP: (132-228)/() 175/73     Weight: 63.5 kg (140 lb)  Height: 5' 6" (167.6 cm)  Body mass index is 22.6 kg/m².      Intake/Output Summary (Last 24 hours) at 1/10/2020 1735  Last data filed at 1/10/2020 1242  Gross per 24 hour   Intake 600 ml   Output --   Net 600 ml       Ortho/SPM Exam   Gen:  No acute distress  CV:  Peripherally well-perfused.    Lungs:  Normal respiratory effort.  Head/Neck:  Normocephalic.  Atraumatic.    MSK:  LLE:  - 2 anterior tibia superficial skin ulcers covered w/out drainage.  - Swelling distal to calf w/ signficant erythema and increased cap refill. Mildly TTP throughout lower leg distal to erythema.   - AROM and PROM intact, but knee ROM limited to  deg.   - TA/EHL/Gastroc/FHL assessed in isolation without deficit  - SILT, but decreased inferior to knee.   - Compartments soft  - DP and PT palpated  2+  - Capillary Refill <3s    RLE:  - Anterior tibial vertical superficial skin ulcer and posteromedial calf with 4cm diameter superficial skin ulcer covered w/out drainage.   - Swelling distal to calf w/ signficant erythema and " increased cap refill. Mildly TTP throughout lower leg distal to erythema.   - AROM and PROM intact, but knee ROM limited to 70-90 deg  - TA/EHL/Gastroc/FHL assessed in isolation without deficit  - SILT, but decreased inferior to knee.   - Compartments soft  - DP and PT palpated  2+  - Capillary Refill <3s    All other joints (shoulder/elbow/wrist/hip/knee/ankle) were examined and had full ROM and were non-tender to palpation.    Significant Labs: All pertinent labs within the past 24 hours have been reviewed.    Significant Imaging: X-Ray: I have reviewed all pertinent results/findings and my personal findings are:    Right knee Xray : Severe chronic OA, no acute fractures or dislocations.  Right Tib/Fib Xray: As above, otherwise no acute fractures or dislocations.     Assessment/Plan:     Osteoarthritis of right knee  Debbie Dietz is a 89 y.o. female with chronic severe right knee OA with a significant flexion contracture likely 2/2 to her disease and decreased mobility.   - Recc NSAIDs, rest, ice, elevation PRN for symptom control  - Recc PT/OT while inpatient   - WBAT BLE  - no acute ortho intervention at this time, will sign off.         Chris Suero MD  Orthopedics  [facility]

## 2020-01-10 NOTE — SUBJECTIVE & OBJECTIVE
Past Medical History:   Diagnosis Date    Diabetes mellitus     Hypertension        Past Surgical History:   Procedure Laterality Date    MASTECTOMY Left        Review of patient's allergies indicates:   Allergen Reactions    Codeine Nausea Only    Darvon [propoxyphene] Nausea Only       No current facility-administered medications on file prior to encounter.      Current Outpatient Medications on File Prior to Encounter   Medication Sig    CEPHALEXIN ORAL Take by mouth.    IRBESARTAN ORAL Take by mouth.    SIMVASTATIN ORAL Take by mouth.    triamcinolone (KENALOG) 0.5 % ointment Apply topically 2 (two) times daily.    [DISCONTINUED] GLIMEPIRIDE ORAL Take by mouth.     Family History     None        Tobacco Use    Smoking status: Never Smoker    Smokeless tobacco: Never Used   Substance and Sexual Activity    Alcohol use: Not on file    Drug use: Not on file    Sexual activity: Not on file     Review of Systems   Constitutional: Positive for chills and fatigue. Negative for diaphoresis and fever.   HENT: Negative for congestion and sore throat.    Eyes: Negative for discharge and visual disturbance.   Respiratory: Negative for cough and shortness of breath.    Cardiovascular: Positive for leg swelling. Negative for chest pain.   Gastrointestinal: Negative for abdominal pain, nausea and vomiting.   Genitourinary: Negative for difficulty urinating.   Musculoskeletal: Positive for arthralgias. Negative for joint swelling.   Skin: Positive for rash and wound.   Allergic/Immunologic: Negative for immunocompromised state.   Neurological: Positive for tremors (with hypoglycemia), speech difficulty and weakness. Negative for light-headedness and headaches.   Psychiatric/Behavioral: Negative for agitation and confusion.     Objective:     Vital Signs (Most Recent):  Temp: 97.4 °F (36.3 °C) (01/10/20 1001)  Pulse: 81 (01/10/20 1241)  Resp: 16 (01/10/20 1001)  BP: (!) 181/74 (01/10/20 1241)  SpO2: 99 % (01/10/20  1241) Vital Signs (24h Range):  Temp:  [97.4 °F (36.3 °C)] 97.4 °F (36.3 °C)  Pulse:  [81-91] 81  Resp:  [16] 16  SpO2:  [94 %-100 %] 99 %  BP: (160-228)/() 181/74     Weight: 63.5 kg (140 lb)  Body mass index is 22.6 kg/m².    Physical Exam   Constitutional: She is oriented to person, place, and time. She appears well-developed and well-nourished. No distress.   Frail   HENT:   Head: Normocephalic and atraumatic.   Nose: Nose normal.   Eyes: Pupils are equal, round, and reactive to light. EOM are normal. No scleral icterus.   Neck: Normal range of motion. No JVD present. No tracheal deviation present.   Cardiovascular: Normal rate, regular rhythm and normal heart sounds.   Pulmonary/Chest: Effort normal and breath sounds normal. No respiratory distress.   Abdominal: Soft. She exhibits no distension. There is no tenderness.   Musculoskeletal: She exhibits edema (bilateral 2+) and tenderness. She exhibits no deformity.   Bilateral legs bandaged; see photos in ED note for ulcer picture   Neurological: She is alert and oriented to person, place, and time.   Skin: Skin is warm and dry. Rash noted. She is not diaphoretic. There is erythema (bilateral, blanching).   Psychiatric: She has a normal mood and affect. Her behavior is normal.   Vitals reviewed.        CRANIAL NERVES     CN III, IV, VI   Pupils are equal, round, and reactive to light.  Extraocular motions are normal.        Significant Labs: All pertinent labs within the past 24 hours have been reviewed.    Significant Imaging: I have reviewed all pertinent imaging results/findings within the past 24 hours.

## 2020-01-10 NOTE — HPI
Ms. Dietz is a 89yo woman with HTN, DM2 not on insulin, and LE ulcer who presents with episode of hypoglycemia and slurred speech. She states that she developed bilateral leg swelling and venous stasis ulcers shortly after Thanksgiving. She was set up with home health and wound care with some improvement. 2 weeks ago, she had increase in bilateral swelling and was started on PO lasix. She notes that over the past week, she has started to have bilateral redness to the legs, as well as increased pain of right LE ulcer. She notes that over the past week, she feels like her glucose has been dropping occasionally, with episodes of shaking and feeling fatigued, which improve with eating. On evening prior to admission, she was having increased right leg pain and took an advil to help her sleep. On morning of admit, she felt generalized weakness and had slurred speech. She was able to contact her daughter with phone by bedside and EMS was called. Glucose found to be 59; she was given D50 with complete resolution of symptoms.    In the ED, she was afebrile with HR 90 and /110. Found to have WBC 12.9 with K 5.7 and Cr 1.5.

## 2020-01-10 NOTE — ASSESSMENT & PLAN NOTE
- worst in bilateral knee joints; patient has been using wheelchair due to pain  - uses prn APAP with rare advil usage at home  - continue prn APAP

## 2020-01-10 NOTE — ASSESSMENT & PLAN NOTE
- K 5.7 on admission and 5.5 on repeat; EKG w/o changes  - suspect 2/2 MARY due to infection/ARB/NSAID use  - will give lasix 40mg IV x1 and repeat BMP at 6pm (ordered)  - defer shift with insulin given hypoglycemia on admit

## 2020-01-10 NOTE — ASSESSMENT & PLAN NOTE
- no A1c on file; will check  - on glimepiride at home; suspect developed hypoglycemia in the setting of infection with MARY due to infection/ARB/NSAID use  - LDSSI with accuchecks qachs while in house  - long term would likely benefit from alternate medication with less risk of hypoglycemia

## 2020-01-11 LAB
ANION GAP SERPL CALC-SCNC: 8 MMOL/L (ref 8–16)
BASOPHILS # BLD AUTO: 0.02 K/UL (ref 0–0.2)
BASOPHILS NFR BLD: 0.2 % (ref 0–1.9)
BUN SERPL-MCNC: 40 MG/DL (ref 10–30)
CALCIUM SERPL-MCNC: 8.7 MG/DL (ref 8.7–10.5)
CHLORIDE SERPL-SCNC: 110 MMOL/L (ref 95–110)
CO2 SERPL-SCNC: 20 MMOL/L (ref 23–29)
CREAT SERPL-MCNC: 1.6 MG/DL (ref 0.5–1.4)
DIFFERENTIAL METHOD: ABNORMAL
EOSINOPHIL # BLD AUTO: 0.1 K/UL (ref 0–0.5)
EOSINOPHIL NFR BLD: 0.7 % (ref 0–8)
ERYTHROCYTE [DISTWIDTH] IN BLOOD BY AUTOMATED COUNT: 15.5 % (ref 11.5–14.5)
EST. GFR  (AFRICAN AMERICAN): 31.8 ML/MIN/1.73 M^2
EST. GFR  (NON AFRICAN AMERICAN): 27.6 ML/MIN/1.73 M^2
GLUCOSE SERPL-MCNC: 70 MG/DL (ref 70–110)
HCT VFR BLD AUTO: 31.3 % (ref 37–48.5)
HGB BLD-MCNC: 9.3 G/DL (ref 12–16)
IMM GRANULOCYTES # BLD AUTO: 0.06 K/UL (ref 0–0.04)
IMM GRANULOCYTES NFR BLD AUTO: 0.6 % (ref 0–0.5)
LYMPHOCYTES # BLD AUTO: 1.2 K/UL (ref 1–4.8)
LYMPHOCYTES NFR BLD: 11.3 % (ref 18–48)
MAGNESIUM SERPL-MCNC: 2 MG/DL (ref 1.6–2.6)
MCH RBC QN AUTO: 31.1 PG (ref 27–31)
MCHC RBC AUTO-ENTMCNC: 29.7 G/DL (ref 32–36)
MCV RBC AUTO: 105 FL (ref 82–98)
MONOCYTES # BLD AUTO: 0.9 K/UL (ref 0.3–1)
MONOCYTES NFR BLD: 8.5 % (ref 4–15)
NEUTROPHILS # BLD AUTO: 8.1 K/UL (ref 1.8–7.7)
NEUTROPHILS NFR BLD: 78.7 % (ref 38–73)
NRBC BLD-RTO: 0 /100 WBC
PHOSPHATE SERPL-MCNC: 4.5 MG/DL (ref 2.7–4.5)
PLATELET # BLD AUTO: 279 K/UL (ref 150–350)
PMV BLD AUTO: 9.6 FL (ref 9.2–12.9)
POCT GLUCOSE: 76 MG/DL (ref 70–110)
POTASSIUM SERPL-SCNC: 4.9 MMOL/L (ref 3.5–5.1)
RBC # BLD AUTO: 2.99 M/UL (ref 4–5.4)
SODIUM SERPL-SCNC: 138 MMOL/L (ref 136–145)
WBC # BLD AUTO: 10.28 K/UL (ref 3.9–12.7)

## 2020-01-11 PROCEDURE — 85025 COMPLETE CBC W/AUTO DIFF WBC: CPT

## 2020-01-11 PROCEDURE — 25000003 PHARM REV CODE 250: Performed by: HOSPITALIST

## 2020-01-11 PROCEDURE — 99222 PR INITIAL HOSPITAL CARE,LEVL II: ICD-10-PCS | Mod: ,,, | Performed by: ORTHOPAEDIC SURGERY

## 2020-01-11 PROCEDURE — 94761 N-INVAS EAR/PLS OXIMETRY MLT: CPT

## 2020-01-11 PROCEDURE — 99233 SBSQ HOSP IP/OBS HIGH 50: CPT | Mod: ,,, | Performed by: HOSPITALIST

## 2020-01-11 PROCEDURE — 80048 BASIC METABOLIC PNL TOTAL CA: CPT

## 2020-01-11 PROCEDURE — 36415 COLL VENOUS BLD VENIPUNCTURE: CPT

## 2020-01-11 PROCEDURE — 63600175 PHARM REV CODE 636 W HCPCS: Performed by: HOSPITALIST

## 2020-01-11 PROCEDURE — 83735 ASSAY OF MAGNESIUM: CPT

## 2020-01-11 PROCEDURE — 11000001 HC ACUTE MED/SURG PRIVATE ROOM

## 2020-01-11 PROCEDURE — 84100 ASSAY OF PHOSPHORUS: CPT

## 2020-01-11 PROCEDURE — 99233 PR SUBSEQUENT HOSPITAL CARE,LEVL III: ICD-10-PCS | Mod: ,,, | Performed by: HOSPITALIST

## 2020-01-11 PROCEDURE — 99222 1ST HOSP IP/OBS MODERATE 55: CPT | Mod: ,,, | Performed by: ORTHOPAEDIC SURGERY

## 2020-01-11 RX ORDER — FUROSEMIDE 10 MG/ML
40 INJECTION INTRAMUSCULAR; INTRAVENOUS ONCE
Status: COMPLETED | OUTPATIENT
Start: 2020-01-11 | End: 2020-01-11

## 2020-01-11 RX ADMIN — SIMVASTATIN 20 MG: 20 TABLET, FILM COATED ORAL at 08:01

## 2020-01-11 RX ADMIN — VANCOMYCIN HYDROCHLORIDE 500 MG: 500 INJECTION, POWDER, LYOPHILIZED, FOR SOLUTION INTRAVENOUS at 03:01

## 2020-01-11 RX ADMIN — AMLODIPINE BESYLATE 5 MG: 5 TABLET ORAL at 10:01

## 2020-01-11 RX ADMIN — ACETAMINOPHEN 650 MG: 325 TABLET ORAL at 08:01

## 2020-01-11 RX ADMIN — HEPARIN SODIUM 5000 UNITS: 5000 INJECTION, SOLUTION INTRAVENOUS; SUBCUTANEOUS at 10:01

## 2020-01-11 RX ADMIN — HEPARIN SODIUM 5000 UNITS: 5000 INJECTION, SOLUTION INTRAVENOUS; SUBCUTANEOUS at 08:01

## 2020-01-11 RX ADMIN — Medication 6 MG: at 08:01

## 2020-01-11 RX ADMIN — ACETAMINOPHEN 1000 MG: 500 TABLET ORAL at 02:01

## 2020-01-11 RX ADMIN — FUROSEMIDE 40 MG: 10 INJECTION, SOLUTION INTRAMUSCULAR; INTRAVENOUS at 03:01

## 2020-01-11 NOTE — ASSESSMENT & PLAN NOTE
- bilateral blanching erythema with leukocytosis and venous stasis ulcer of RLE  - given 500cc NS in ED  - initiated on vancomycin and zosyn in ED, will continue vanc for now  - BCx NGTD and leukocytosis improving  - can likely transition to PO antibiotics tomorrow if continues to improve  - Wound Care consult  - lasix to decrease edema and aid healing of ulcer

## 2020-01-11 NOTE — SUBJECTIVE & OBJECTIVE
Interval History: improved somewhat today, reports good diuresis yesterday with lasix and improvement in leg swelling and pain. Slept well overnight. Still with erythema in exposed skin, may be component of chronic venous stasis rather than purely infection.    Discussed with patient and daughter and do not feel that she would be safe at home alone at this point. Awaiting PT/OT eval, but interested in NH.    Review of Systems   Constitutional: Negative for fatigue and fever.   Respiratory: Negative for shortness of breath.    Cardiovascular: Positive for leg swelling. Negative for chest pain.   Gastrointestinal: Negative for nausea and vomiting.   Musculoskeletal: Positive for arthralgias.   Skin: Positive for rash and wound.   Neurological: Positive for weakness.     Objective:     Vital Signs (Most Recent):  Temp: 97 °F (36.1 °C) (01/11/20 0808)  Pulse: 74 (01/11/20 1149)  Resp: 18 (01/11/20 1149)  BP: (!) 180/77 (01/11/20 0808)  SpO2: (!) 94 % (01/11/20 1149) Vital Signs (24h Range):  Temp:  [97 °F (36.1 °C)-98.3 °F (36.8 °C)] 97 °F (36.1 °C)  Pulse:  [71-94] 74  Resp:  [15-18] 18  SpO2:  [94 %-99 %] 94 %  BP: (119-181)/(57-77) 180/77     Weight: 62.1 kg (136 lb 15.9 oz)  Body mass index is 22.11 kg/m².    Intake/Output Summary (Last 24 hours) at 1/11/2020 1201  Last data filed at 1/11/2020 0600  Gross per 24 hour   Intake 1090 ml   Output 450 ml   Net 640 ml      Physical Exam   Constitutional: She is oriented to person, place, and time. She appears well-developed and well-nourished. No distress.   Frail   HENT:   Head: Normocephalic and atraumatic.   Nose: Nose normal.   Eyes: Pupils are equal, round, and reactive to light. EOM are normal. No scleral icterus.   Neck: Normal range of motion. No JVD present. No tracheal deviation present.   Cardiovascular: Normal rate, regular rhythm and normal heart sounds.   Pulmonary/Chest: Effort normal and breath sounds normal. No respiratory distress.   Abdominal: Soft. She  exhibits no distension. There is no tenderness.   Musculoskeletal: She exhibits edema (bilateral 2+, improving) and tenderness. She exhibits no deformity.   Bilateral legs bandaged; see photos in ED note for ulcer picture   Neurological: She is alert and oriented to person, place, and time.   Skin: Skin is warm and dry. Rash noted. She is not diaphoretic. There is erythema (bilateral, blanching).   Psychiatric: She has a normal mood and affect. Her behavior is normal.   Vitals reviewed.      Computed MELD-Na score unavailable. Necessary lab results were not found in the last year.  Computed MELD score unavailable. Necessary lab results were not found in the last year.    Significant Labs:  CBC:  Recent Labs   Lab 01/10/20  1020 01/10/20  1230 01/11/20  0333   WBC 12.97*  --  10.28   HGB 11.3*  --  9.3*   HCT 37.4 33* 31.3*   *  --  279     CMP:  Recent Labs   Lab 01/10/20  1020 01/10/20  1735 01/11/20  0333    140 138   K 5.7* 5.6* 4.9    109 110   CO2 20* 21* 20*   GLU 90 79 70   BUN 43* 40* 40*   CREATININE 1.5* 1.6* 1.6*   CALCIUM 9.4 9.3 8.7   PROT 7.7  --   --    ALBUMIN 3.5  --   --    BILITOT 0.3  --   --    ALKPHOS 101  --   --    AST 21  --   --    ALT 18  --   --    ANIONGAP 8 10 8   EGFRNONAA 29.8* 27.6* 27.6*     PTINR:  No results for input(s): INR in the last 48 hours.    Significant Procedures:   Dobutamine Stress Test with Color Flow: No results found for this or any previous visit.    None

## 2020-01-11 NOTE — ASSESSMENT & PLAN NOTE
- 2/2 severe bilateral arthritis, worse on right  - PT/OT  - family interested in placement following hospitalization, will discuss with CM

## 2020-01-11 NOTE — ASSESSMENT & PLAN NOTE
- worst in bilateral knee joints; patient has been using wheelchair due to pain  - uses prn APAP with rare advil usage at home  - continue prn APAP and can titrate medication as necessary; avoiding systemic NSAIDs given renal disease but could consider capsaicin, diclofenac cream, lidocaine

## 2020-01-11 NOTE — ASSESSMENT & PLAN NOTE
- had been started on lasix 20mg daily for bilateral lower extremity edema  - likely 2/2 chronic venous stasis; BNP very mildly elevated at 100  - no TTE on file, will check

## 2020-01-11 NOTE — ASSESSMENT & PLAN NOTE
- A1c 5.9  - on glimepiride at home; suspect developed hypoglycemia in the setting of infection with MARY due to infection/ARB/NSAID use  - LDSSI with accuchecks qachs while in house  - long term would likely benefit from alternate medication with less risk of hypoglycemia

## 2020-01-11 NOTE — PLAN OF CARE
Plan of care discussed with pt and her family. Explained the use of sleep aids that the pt has ordered and pt and family verbalized understanding. Pt has been free of injury and falls this shift

## 2020-01-11 NOTE — ASSESSMENT & PLAN NOTE
- concern for superimposed infection  - Wound Care consulted  - APAP prn pain, can consider escalation as warranted - would avoid systemic NSAID for now

## 2020-01-11 NOTE — ASSESSMENT & PLAN NOTE
- K 5.7 on admission, now declining; EKG w/o changes  - suspect 2/2 MARY due to infection/ARB/NSAID use  - given lasix 40mg IV x1 with improvement, will repeat today

## 2020-01-11 NOTE — ASSESSMENT & PLAN NOTE
- unknown baseline but Cr 1.5 on admission with CrCl 21  - suspect due to infection + ARB + NSAID use + HTN vs baseline disease; less likely prerenal due to recent lasix initiation  - given 500cc NS in ED  - hold ARB and NSAIDs for now  - daily BMP

## 2020-01-11 NOTE — PROGRESS NOTES
[facility] Hospital Medicine  Progress Note    Patient Name: Debbie Dietz  MRN: [id number]  Patient Class: IP- Inpatient   Admission Date: 1/10/2020  Length of Stay: 1 days  Attending Physician: Javier Carrillo, *  Primary Care Provider: No primary care provider on file.    Hospital Medicine Team: [facility] Javier Carrillo MD    Subjective:     Principal Problem:Type 2 diabetes mellitus with hypoglycemia, without long-term current use of insulin        HPI:  Ms. Dietz is a 89yo woman with HTN, DM2 not on insulin, and LE ulcer who presents with episode of hypoglycemia and slurred speech. She states that she developed bilateral leg swelling and venous stasis ulcers shortly after Thanksgiving. She was set up with home health and wound care with some improvement. 2 weeks ago, she had increase in bilateral swelling and was started on PO lasix. She notes that over the past week, she has started to have bilateral redness to the legs, as well as increased pain of right LE ulcer. She notes that over the past week, she feels like her glucose has been dropping occasionally, with episodes of shaking and feeling fatigued, which improve with eating. On evening prior to admission, she was having increased right leg pain and took an advil to help her sleep. On morning of admit, she felt generalized weakness and had slurred speech. She was able to contact her daughter with phone by bedside and EMS was called. Glucose found to be 59; she was given D50 with complete resolution of symptoms.    In the ED, she was afebrile with HR 90 and /110. Found to have WBC 12.9 with K 5.7 and Cr 1.5.    Overview/Hospital Course:  No notes on file    Interval History: improved somewhat today, reports good diuresis yesterday with lasix and improvement in leg swelling and pain. Slept well overnight. Still with erythema in exposed skin, may be component of chronic venous stasis rather than purely  infection.    Discussed with patient and daughter and do not feel that she would be safe at home alone at this point. Awaiting PT/OT eval, but interested in NH.    Review of Systems   Constitutional: Negative for fatigue and fever.   Respiratory: Negative for shortness of breath.    Cardiovascular: Positive for leg swelling. Negative for chest pain.   Gastrointestinal: Negative for nausea and vomiting.   Musculoskeletal: Positive for arthralgias.   Skin: Positive for rash and wound.   Neurological: Positive for weakness.     Objective:     Vital Signs (Most Recent):  Temp: 97 °F (36.1 °C) (01/11/20 0808)  Pulse: 74 (01/11/20 1149)  Resp: 18 (01/11/20 1149)  BP: (!) 180/77 (01/11/20 0808)  SpO2: (!) 94 % (01/11/20 1149) Vital Signs (24h Range):  Temp:  [97 °F (36.1 °C)-98.3 °F (36.8 °C)] 97 °F (36.1 °C)  Pulse:  [71-94] 74  Resp:  [15-18] 18  SpO2:  [94 %-99 %] 94 %  BP: (119-181)/(57-77) 180/77     Weight: 62.1 kg (136 lb 15.9 oz)  Body mass index is 22.11 kg/m².    Intake/Output Summary (Last 24 hours) at 1/11/2020 1201  Last data filed at 1/11/2020 0600  Gross per 24 hour   Intake 1090 ml   Output 450 ml   Net 640 ml      Physical Exam   Constitutional: She is oriented to person, place, and time. She appears well-developed and well-nourished. No distress.   Frail   HENT:   Head: Normocephalic and atraumatic.   Nose: Nose normal.   Eyes: Pupils are equal, round, and reactive to light. EOM are normal. No scleral icterus.   Neck: Normal range of motion. No JVD present. No tracheal deviation present.   Cardiovascular: Normal rate, regular rhythm and normal heart sounds.   Pulmonary/Chest: Effort normal and breath sounds normal. No respiratory distress.   Abdominal: Soft. She exhibits no distension. There is no tenderness.   Musculoskeletal: She exhibits edema (bilateral 2+, improving) and tenderness. She exhibits no deformity.   Bilateral legs bandaged; see photos in ED note for ulcer picture   Neurological: She is  alert and oriented to person, place, and time.   Skin: Skin is warm and dry. Rash noted. She is not diaphoretic. There is erythema (bilateral, blanching).   Psychiatric: She has a normal mood and affect. Her behavior is normal.   Vitals reviewed.      Computed MELD-Na score unavailable. Necessary lab results were not found in the last year.  Computed MELD score unavailable. Necessary lab results were not found in the last year.    Significant Labs:  CBC:  Recent Labs   Lab 01/10/20  1020 01/10/20  1230 01/11/20  0333   WBC 12.97*  --  10.28   HGB 11.3*  --  9.3*   HCT 37.4 33* 31.3*   *  --  279     CMP:  Recent Labs   Lab 01/10/20  1020 01/10/20  1735 01/11/20  0333    140 138   K 5.7* 5.6* 4.9    109 110   CO2 20* 21* 20*   GLU 90 79 70   BUN 43* 40* 40*   CREATININE 1.5* 1.6* 1.6*   CALCIUM 9.4 9.3 8.7   PROT 7.7  --   --    ALBUMIN 3.5  --   --    BILITOT 0.3  --   --    ALKPHOS 101  --   --    AST 21  --   --    ALT 18  --   --    ANIONGAP 8 10 8   EGFRNONAA 29.8* 27.6* 27.6*     PTINR:  No results for input(s): INR in the last 48 hours.    Significant Procedures:   Dobutamine Stress Test with Color Flow: No results found for this or any previous visit.    None      Assessment/Plan:      * Type 2 diabetes mellitus with hypoglycemia, without long-term current use of insulin  - A1c 5.9  - on glimepiride at home; suspect developed hypoglycemia in the setting of infection with MARY due to infection/ARB/NSAID use  - LDSSI with accuchecks qachs while in house  - long term would likely benefit from alternate medication with less risk of hypoglycemia    Cellulitis of right lower extremity  - bilateral blanching erythema with leukocytosis and venous stasis ulcer of RLE  - given 500cc NS in ED  - initiated on vancomycin and zosyn in ED, will continue vanc for now  - BCx NGTD and leukocytosis improving  - can likely transition to PO antibiotics tomorrow if continues to improve  - Wound Care consult  -  lasix to decrease edema and aid healing of ulcer      MARY (acute kidney injury)  - unknown baseline but Cr 1.5 on admission with CrCl 21  - suspect due to infection + ARB + NSAID use + HTN vs baseline disease; less likely prerenal due to recent lasix initiation  - given 500cc NS in ED  - hold ARB and NSAIDs for now  - daily BMP      Hyperkalemia  - K 5.7 on admission, now declining; EKG w/o changes  - suspect 2/2 MARY due to infection/ARB/NSAID use  - given lasix 40mg IV x1 with improvement, will repeat today      Bilateral leg edema  - had been started on lasix 20mg daily for bilateral lower extremity edema  - likely 2/2 chronic venous stasis; BNP very mildly elevated at 100  - no TTE on file, will check      HLD (hyperlipidemia)  - stable  - continue home simvastatin      Primary osteoarthritis involving multiple joints  - worst in bilateral knee joints; patient has been using wheelchair due to pain  - uses prn APAP with rare advil usage at home  - continue prn APAP and can titrate medication as necessary; avoiding systemic NSAIDs given renal disease but could consider capsaicin, diclofenac cream, lidocaine      DNR (do not resuscitate)  - code discussion with patient and family at bedside; patient is DNR      Debility  - 2/2 severe bilateral arthritis, worse on right  - PT/OT  - family interested in placement following hospitalization, will discuss with CM      Essential hypertension  - hypertensive on admission  - holding home ARB due to MARY  - likely driven by pain and MARY  - add on amlodipine 5; can consider prn hydralazine if necessary to get patient to bed although would favor conservative treatment with pain control and infection treatment      Venous stasis ulcer  - concern for superimposed infection  - Wound Care consulted  - APAP prn pain, can consider escalation as warranted - would avoid systemic NSAID for now        VTE Risk Mitigation (From admission, onward)         Ordered     heparin (porcine)  injection 5,000 Units  Every 12 hours      01/10/20 1318     IP VTE HIGH RISK PATIENT  Once      01/10/20 1318                      Javier Carrillo MD  Department of Hospital Medicine   [facility]

## 2020-01-11 NOTE — PLAN OF CARE
Pt admitted to unit form ED, pt oriented to room with family at bedside, able to make needs known, VS stable no distress noted, BS checked as ordered.

## 2020-01-12 PROBLEM — R41.0 DELIRIUM: Status: ACTIVE | Noted: 2020-01-12

## 2020-01-12 PROBLEM — E87.5 HYPERKALEMIA: Status: RESOLVED | Noted: 2020-01-10 | Resolved: 2020-01-12

## 2020-01-12 LAB
ANION GAP SERPL CALC-SCNC: 9 MMOL/L (ref 8–16)
ASCENDING AORTA: 2.87 CM
AV INDEX (PROSTH): 0.9
AV MEAN GRADIENT: 6 MMHG
AV PEAK GRADIENT: 12 MMHG
AV VALVE AREA: 2.49 CM2
AV VELOCITY RATIO: 0.67
BASOPHILS # BLD AUTO: 0.02 K/UL (ref 0–0.2)
BASOPHILS NFR BLD: 0.2 % (ref 0–1.9)
BSA FOR ECHO PROCEDURE: 1.7 M2
BUN SERPL-MCNC: 37 MG/DL (ref 10–30)
CALCIUM SERPL-MCNC: 9.1 MG/DL (ref 8.7–10.5)
CHLORIDE SERPL-SCNC: 107 MMOL/L (ref 95–110)
CO2 SERPL-SCNC: 23 MMOL/L (ref 23–29)
CREAT SERPL-MCNC: 1.7 MG/DL (ref 0.5–1.4)
CV ECHO LV RWT: 0.58 CM
DIFFERENTIAL METHOD: ABNORMAL
DOP CALC AO PEAK VEL: 1.76 M/S
DOP CALC AO VTI: 32.1 CM
DOP CALC LVOT AREA: 2.8 CM2
DOP CALC LVOT DIAMETER: 1.88 CM
DOP CALC LVOT PEAK VEL: 1.18 M/S
DOP CALC LVOT STROKE VOLUME: 79.88 CM3
DOP CALCLVOT PEAK VEL VTI: 28.79 CM
E WAVE DECELERATION TIME: 304.74 MSEC
E/A RATIO: 0.68
E/E' RATIO: 21.11 M/S
ECHO LV POSTERIOR WALL: 1 CM (ref 0.6–1.1)
EOSINOPHIL # BLD AUTO: 0.1 K/UL (ref 0–0.5)
EOSINOPHIL NFR BLD: 1.2 % (ref 0–8)
ERYTHROCYTE [DISTWIDTH] IN BLOOD BY AUTOMATED COUNT: 15.7 % (ref 11.5–14.5)
EST. GFR  (AFRICAN AMERICAN): 29.5 ML/MIN/1.73 M^2
EST. GFR  (NON AFRICAN AMERICAN): 25.6 ML/MIN/1.73 M^2
FRACTIONAL SHORTENING: 35 % (ref 28–44)
GLUCOSE SERPL-MCNC: 141 MG/DL (ref 70–110)
HCT VFR BLD AUTO: 31.1 % (ref 37–48.5)
HGB BLD-MCNC: 10 G/DL (ref 12–16)
IMM GRANULOCYTES # BLD AUTO: 0.05 K/UL (ref 0–0.04)
IMM GRANULOCYTES NFR BLD AUTO: 0.6 % (ref 0–0.5)
INTERVENTRICULAR SEPTUM: 0.99 CM (ref 0.6–1.1)
IVRT: 0.07 MSEC
LA MAJOR: 5.34 CM
LA MINOR: 5.6 CM
LA WIDTH: 4.17 CM
LEFT ATRIUM SIZE: 2.73 CM
LEFT ATRIUM VOLUME INDEX: 31.1 ML/M2
LEFT ATRIUM VOLUME: 52.9 CM3
LEFT INTERNAL DIMENSION IN SYSTOLE: 2.26 CM (ref 2.1–4)
LEFT VENTRICLE DIASTOLIC VOLUME INDEX: 28.98 ML/M2
LEFT VENTRICLE DIASTOLIC VOLUME: 49.35 ML
LEFT VENTRICLE MASS INDEX: 59 G/M2
LEFT VENTRICLE SYSTOLIC VOLUME INDEX: 10.2 ML/M2
LEFT VENTRICLE SYSTOLIC VOLUME: 17.32 ML
LEFT VENTRICULAR INTERNAL DIMENSION IN DIASTOLE: 3.46 CM (ref 3.5–6)
LEFT VENTRICULAR MASS: 100.82 G
LV LATERAL E/E' RATIO: 19 M/S
LV SEPTAL E/E' RATIO: 23.75 M/S
LYMPHOCYTES # BLD AUTO: 1.2 K/UL (ref 1–4.8)
LYMPHOCYTES NFR BLD: 13.9 % (ref 18–48)
MAGNESIUM SERPL-MCNC: 1.9 MG/DL (ref 1.6–2.6)
MCH RBC QN AUTO: 32.7 PG (ref 27–31)
MCHC RBC AUTO-ENTMCNC: 32.2 G/DL (ref 32–36)
MCV RBC AUTO: 102 FL (ref 82–98)
MONOCYTES # BLD AUTO: 0.8 K/UL (ref 0.3–1)
MONOCYTES NFR BLD: 9.2 % (ref 4–15)
MV PEAK A VEL: 1.4 M/S
MV PEAK E VEL: 0.95 M/S
NEUTROPHILS # BLD AUTO: 6.6 K/UL (ref 1.8–7.7)
NEUTROPHILS NFR BLD: 74.9 % (ref 38–73)
NRBC BLD-RTO: 0 /100 WBC
PHOSPHATE SERPL-MCNC: 4.3 MG/DL (ref 2.7–4.5)
PISA TR MAX VEL: 3.16 M/S
PLATELET # BLD AUTO: 295 K/UL (ref 150–350)
PMV BLD AUTO: 9.5 FL (ref 9.2–12.9)
POCT GLUCOSE: 135 MG/DL (ref 70–110)
POCT GLUCOSE: 148 MG/DL (ref 70–110)
POCT GLUCOSE: 189 MG/DL (ref 70–110)
POCT GLUCOSE: 217 MG/DL (ref 70–110)
POTASSIUM SERPL-SCNC: 4.5 MMOL/L (ref 3.5–5.1)
PULM VEIN S/D RATIO: 2.08
PV PEAK D VEL: 0.38 M/S
PV PEAK S VEL: 0.79 M/S
RA MAJOR: 4.52 CM
RA PRESSURE: 8 MMHG
RA WIDTH: 4.21 CM
RBC # BLD AUTO: 3.06 M/UL (ref 4–5.4)
RIGHT VENTRICULAR END-DIASTOLIC DIMENSION: 2.73 CM
RV TISSUE DOPPLER FREE WALL SYSTOLIC VELOCITY 1 (APICAL 4 CHAMBER VIEW): 16.35 CM/S
SINUS: 2.89 CM
SODIUM SERPL-SCNC: 139 MMOL/L (ref 136–145)
STJ: 2.45 CM
TDI LATERAL: 0.05 M/S
TDI SEPTAL: 0.04 M/S
TDI: 0.05 M/S
TR MAX PG: 40 MMHG
TRICUSPID ANNULAR PLANE SYSTOLIC EXCURSION: 2.67 CM
TV REST PULMONARY ARTERY PRESSURE: 48 MMHG
WBC # BLD AUTO: 8.82 K/UL (ref 3.9–12.7)

## 2020-01-12 PROCEDURE — 85025 COMPLETE CBC W/AUTO DIFF WBC: CPT

## 2020-01-12 PROCEDURE — 83735 ASSAY OF MAGNESIUM: CPT

## 2020-01-12 PROCEDURE — 80048 BASIC METABOLIC PNL TOTAL CA: CPT

## 2020-01-12 PROCEDURE — 99233 SBSQ HOSP IP/OBS HIGH 50: CPT | Mod: ,,, | Performed by: HOSPITALIST

## 2020-01-12 PROCEDURE — 84100 ASSAY OF PHOSPHORUS: CPT

## 2020-01-12 PROCEDURE — 25000003 PHARM REV CODE 250: Performed by: HOSPITALIST

## 2020-01-12 PROCEDURE — 11000001 HC ACUTE MED/SURG PRIVATE ROOM

## 2020-01-12 PROCEDURE — 63600175 PHARM REV CODE 636 W HCPCS: Performed by: HOSPITALIST

## 2020-01-12 PROCEDURE — 99233 PR SUBSEQUENT HOSPITAL CARE,LEVL III: ICD-10-PCS | Mod: ,,, | Performed by: HOSPITALIST

## 2020-01-12 PROCEDURE — 97162 PT EVAL MOD COMPLEX 30 MIN: CPT

## 2020-01-12 PROCEDURE — 97530 THERAPEUTIC ACTIVITIES: CPT

## 2020-01-12 PROCEDURE — 36415 COLL VENOUS BLD VENIPUNCTURE: CPT

## 2020-01-12 PROCEDURE — 97165 OT EVAL LOW COMPLEX 30 MIN: CPT

## 2020-01-12 RX ORDER — AMLODIPINE BESYLATE 10 MG/1
10 TABLET ORAL DAILY
Status: DISCONTINUED | OUTPATIENT
Start: 2020-01-13 | End: 2020-01-15 | Stop reason: HOSPADM

## 2020-01-12 RX ORDER — FUROSEMIDE 20 MG/1
20 TABLET ORAL DAILY
Status: DISCONTINUED | OUTPATIENT
Start: 2020-01-13 | End: 2020-01-13

## 2020-01-12 RX ORDER — HALOPERIDOL 1 MG/1
1 TABLET ORAL EVERY 8 HOURS PRN
Status: DISCONTINUED | OUTPATIENT
Start: 2020-01-12 | End: 2020-01-15 | Stop reason: HOSPADM

## 2020-01-12 RX ORDER — CEPHALEXIN 500 MG/1
500 CAPSULE ORAL EVERY 8 HOURS
Status: DISCONTINUED | OUTPATIENT
Start: 2020-01-12 | End: 2020-01-13

## 2020-01-12 RX ADMIN — ACETAMINOPHEN 1000 MG: 500 TABLET ORAL at 08:01

## 2020-01-12 RX ADMIN — HALOPERIDOL 1 MG: 1 TABLET ORAL at 11:01

## 2020-01-12 RX ADMIN — CEPHALEXIN 500 MG: 500 CAPSULE ORAL at 01:01

## 2020-01-12 RX ADMIN — SIMVASTATIN 20 MG: 20 TABLET, FILM COATED ORAL at 09:01

## 2020-01-12 RX ADMIN — HEPARIN SODIUM 5000 UNITS: 5000 INJECTION, SOLUTION INTRAVENOUS; SUBCUTANEOUS at 08:01

## 2020-01-12 RX ADMIN — Medication 6 MG: at 09:01

## 2020-01-12 RX ADMIN — AMLODIPINE BESYLATE 5 MG: 5 TABLET ORAL at 08:01

## 2020-01-12 RX ADMIN — CEPHALEXIN 500 MG: 500 CAPSULE ORAL at 10:01

## 2020-01-12 RX ADMIN — HEPARIN SODIUM 5000 UNITS: 5000 INJECTION, SOLUTION INTRAVENOUS; SUBCUTANEOUS at 09:01

## 2020-01-12 NOTE — PROGRESS NOTES
Pharmacist Renal Dose Adjustment Note    Debbie Dietz is a 89 y.o. female being treated with the medication Cephalexin    Patient Data:    Vital Signs (Most Recent):  Temp: 97.3 °F (36.3 °C) (01/12/20 0404)  Pulse: 66 (01/12/20 0404)  Resp: 18 (01/12/20 0404)  BP: (!) 199/82 (01/12/20 0404)  SpO2: (!) 90 % (01/12/20 0404)   Vital Signs (72h Range):  Temp:  [97 °F (36.1 °C)-98.5 °F (36.9 °C)]   Pulse:  [64-94]   Resp:  [15-18]   BP: (119-228)/()   SpO2:  [90 %-100 %]      Recent Labs   Lab 01/10/20  1735 01/11/20  0333 01/12/20  0334   CREATININE 1.6* 1.6* 1.7*     Serum creatinine: 1.7 mg/dL (H) 01/12/20 0334  Estimated creatinine clearance: 19.4 mL/min (A)    Cephalexin 500mg  PO Q6h will be changed to Cephalexin 500mg PO Q8h due to renal function.     Pharmacist's Name: Julia Yeondam Roh  Pharmacist's Extension: [phone]

## 2020-01-12 NOTE — PLAN OF CARE
Plan of care reviewed with pt. Pt has not c/o any confusion since the start of this shitt. All questions were answered and encouraged. Pt has been free from falls and injury.

## 2020-01-12 NOTE — ASSESSMENT & PLAN NOTE
- episodes of delirium with hallucinations  - delirium precautions:    1) During the day, please open the shades and turn on the lights- If patient is in private room, please make sure they are close to the window.   2) Make sure the correct date and time is written on the whiteboard, as well as the current care team  3) Minimize interruptions at night-time, close shades and turn off TV. No vitals between 11PM and 5AM  4) When possible, during daytime make sure patient is in chair and provide activities that engage patient.  5) Please make sure patient has hearing aids and glasses while awake.    - prn haldol for agitation

## 2020-01-12 NOTE — PLAN OF CARE
Problem: Physical Therapy Goal  Goal: Physical Therapy Goal  Description  PT goals until 1/20/20    1. Pt supine to sit with SBA-not met  2. Pt sit to supine with SBA-not met  3. Pt sit to stand with RW with SBA-not met  4. Pt to perform gait 15ft with RW with SBA.-not met  5. Pt to go up/down curb step with RW with CGA.-not met  6. Pt to transfer bed to/from bedside chair with RW with CGA.-not met  7. Pt to perform B LE exs in sitting or supine x 10 reps to strengthen B LE to improve functional mobility.-not met     Outcome: Ongoing, Progressing   Pt's goals set and pt will benefit from skilled PT services to work towards improved functional mobility including: bed mobility, transfers, and gait.   Karen Rivera, PT  1/12/2020

## 2020-01-12 NOTE — PLAN OF CARE
Problem: Occupational Therapy Goal  Goal: Occupational Therapy Goal  Description  Goals to be met by:  1/27/2020    Patient will increase functional independence with ADLs by performing:    UE Dressing with Set-up Assistance.  LE Dressing with minimal assistance.  Toileting from bedside commode with Stand-by Assistance for hygiene and clothing management.   Supine to sit with Stand-by Assistance.  Stand pivot transfers with Stand-by Assistance.  Toilet transfer to bedside commode with Contact Guard Assistance.      1/12/2020 1507 by SAHARA Au  Outcome: Ongoing, Progressing   Patient's goals are set.   SAHARA Perez  1/12/2020

## 2020-01-12 NOTE — PT/OT/SLP EVAL
CARDIOVASCULAR - ADULT    • Maintains optimal cardiac output and hemodynamic stability  STRESS TEST DONE THIS AM. DENIES CHEST PAIN Progressing        Impaired Activities of Daily Living    • Achieve highest/safest level of independence in self care  [facility] Occupational Therapy   Evaluation    Name: Debbie Dietz  MRN: [id number]  Admitting Diagnosis:  Type 2 diabetes mellitus with hypoglycemia, without long-term current use of insulin      Recommendations:     Discharge Recommendations: nursing facility, skilled  Discharge Equipment Recommendations:  none  Barriers to discharge:  Decreased caregiver support, Inaccessible home environment(will needs supervision/24/7 assistance upon discharge. Patient lived alone and has 1 ETHEL)    Assessment:     Debbie Dietz is a 89 y.o. female with a medical diagnosis of Type 2 diabetes mellitus with hypoglycemia, without long-term current use of insulin.  Performance deficits affecting function: weakness, impaired endurance, impaired self care skills, impaired functional mobilty, gait instability, impaired balance, impaired skin, decreased ROM, decreased lower extremity function, decreased upper extremity function. Patient is motivated to participate in therapy, but agrees that she will need assistance with ADLs and functional mobility tasks upon discharge. Recommending patient discharge to a SNF when medically appropriate to maximize functional independence, reduce burden of care, and ensure safety.      Rehab Prognosis: Good; patient would benefit from acute skilled OT services to address these deficits and reach maximum level of function.       Plan:     Patient to be seen 3 x/week to address the above listed problems via self-care/home management, therapeutic activities, therapeutic exercises  · Plan of Care Expires: 02/12/20  · Plan of Care Reviewed with: patient    Subjective     Chief Complaint: none  Patient/Family Comments/goals: no stated goals    Occupational Profile:  Pt lives alone in a 1 story home with 1STE. Pt states she may have to stay with her son or in a NH upon D/C.  Prior to admission, patients level of function was transfers to w/c independently then uses her LEs to push herself around the home, walks with  walk in the bathroom due to w/c won't fit. Patient was mostly spongebathing. Patient reported that performing her ADLs independently PTA was hard.  Patient reports that son assists with grocery and she has someone come in to her house to clean. Equipment used at home: bath bench, walker, rolling, wheelchair.  Upon discharge, patient will have assistance from unknown.    Pain/Comfort:  · Pain Rating 1: (patient did not rate)  · Location - Side 1: Bilateral  · Location - Orientation 1: lower  · Location 1: leg  · Pain Addressed 1: Reposition, Distraction, Cessation of Activity  · Pain Rating Post-Intervention 1: (patient did not rate)    Patients cultural, spiritual, Scientologist conflicts given the current situation: no    Objective:     Communicated with: NSG prior to session.  Patient found HOB elevated with PureWick, telemetry upon OT entry to room.    General Precautions: Standard, fall   Orthopedic Precautions:N/A   Braces: N/A     Occupational Performance:    Bed Mobility:    · Patient completed Supine to Sit with minimum assistance    Functional Mobility/Transfers:  · Patient completed Sit <> Stand Transfer with minimum assistance  with  rolling walker   · Patient completed Bed > Chair Transfer using Stand Pivot technique with minimum assistance with rolling walker    Activities of Daily Living:  · Lower Body Dressing: total assistance Donning socks  · Toileting: total assistance patient has PureWik in place    Cognitive/Visual Perceptual:  Cognitive/Psychosocial Skills:     -       Oriented to: Person and Place   -       Follows Commands/attention:Follows multistep  commands  -       Communication: clear/fluent  -       Memory: No Deficits noted  -       Safety awareness/insight to disability: intact   -       Mood/Affect/Coping skills/emotional control: Appropriate to situation  Visual/Perceptual:      -Intact      Physical Exam:  Upper Extremity Range of Motion:     -       Right Upper Extremity: limited  shoulder flexion  -       Left Upper Extremity: limited shoulder flexion  Upper Extremity Strength:    -       Right Upper Extremity: 3-/5  -       Left Upper Extremity: 3-/5   Strength:    -       Right Upper Extremity: WFL  -       Left Upper Extremity: WFL  Fine Motor Coordination:    -       Intact    AMPAC 6 Click ADL:  AMPAC Total Score: 14    Treatment & Education:  Role of OT and POC  Safety  ADL retraining  Functional mobility training  Importance of OOB activity  Discharge planning  Education:    Patient left up in chair with call button in reach, nurse notified and all needs met.     GOALS:   Multidisciplinary Problems     Occupational Therapy Goals        Problem: Occupational Therapy Goal    Goal Priority Disciplines Outcome Interventions   Occupational Therapy Goal     OT, PT/OT Ongoing, Progressing    Description:  Goals to be met by:  1/27/2020    Patient will increase functional independence with ADLs by performing:    UE Dressing with Set-up Assistance.  LE Dressing with minimal assistance.  Toileting from bedside commode with Stand-by Assistance for hygiene and clothing management.   Supine to sit with Stand-by Assistance.  Stand pivot transfers with Stand-by Assistance.  Toilet transfer to bedside commode with Contact Guard Assistance.                       History:     Past Medical History:   Diagnosis Date    Diabetes mellitus     Hypertension        Past Surgical History:   Procedure Laterality Date    MASTECTOMY Left        Time Tracking:     OT Date of Treatment: 01/12/20  OT Start Time: 0920  OT Stop Time: 0934  OT Total Time (min): 14 min    Billable Minutes:Evaluation 14    SAHARA Perez  1/12/2020

## 2020-01-12 NOTE — ASSESSMENT & PLAN NOTE
- 2/2 severe bilateral arthritis, worse on right  - PT/OT recommend SNF  - family interested in placement following hospitalization, will discuss with CM

## 2020-01-12 NOTE — ASSESSMENT & PLAN NOTE
- had been started on lasix 20mg daily for bilateral lower extremity edema  - likely 2/2 chronic venous stasis; BNP very mildly elevated at 100  - no TTE on file, will check  - resume home lasix 20mg daily

## 2020-01-12 NOTE — PT/OT/SLP EVAL
"Physical Therapy Evaluation    Patient Name:  Debbie Dietz   MRN:  [id number]    Recommendations:     Discharge Recommendations:  nursing facility, skilled   Discharge Equipment Recommendations: none   Barriers to discharge: Inaccessible home and Decreased caregiver support lives alone with 1 ETHEL    Assessment:     Debbie Dietz is a 89 y.o. female admitted with a medical diagnosis of Type 2 diabetes mellitus with hypoglycemia, without long-term current use of insulin.  She presents with the following impairments/functional limitations:  weakness, impaired functional mobilty, impaired endurance, gait instability, impaired self care skills, impaired balance, decreased lower extremity function, decreased ROM, pain, impaired skin . Pt is motivated to progress with mobility. Pt is unsafe with functional mobility at this time without assist due to required minimal assist for bed mobility and gait with RW and  moderate assist for transfers.     Rehab Prognosis: Good; patient would benefit from acute skilled PT services to address these deficits and reach maximum level of function.    Recent Surgery: * No surgery found *      Plan:     During this hospitalization, patient to be seen 3 x/week to address the identified rehab impairments via gait training, therapeutic activities, therapeutic exercises, neuromuscular re-education and progress toward the following goals:    · Plan of Care Expires:  02/11/20    Subjective   "It feels good to be out of the bed" (PT passed by pt's room later and heard pt yelling that she is falling. Upon entering room, pt sitting up in bedside chair with legs elevated. Pt states she felt like she was falling forward and that something was making her fall. Nurse notified and PT assisted pt in transfer back to bed as below)    Pain/Comfort:  · Pain Rating 1: (pt c/o B LE pain on R>L LE, did not grade)  · Location - Side 1: Bilateral  · Location - Orientation 1: lower  · Location 1: leg  · Pain " Addressed 1: Reposition, Cessation of Activity  · Pain Rating Post-Intervention 1: 0/10(pt did not c/o pain at rest, only with mobility)    Patients cultural, spiritual, Gnosticism conflicts given the current situation: no    Living Environment:  Pt lives alone in a 1 story home with 1STE. Pt states she may have to stay with her son or in a NH upon D/C.  Prior to admission, patients level of function was transfers to w/c independently then uses her LEs to push herself around the home, walks with walk in the bathroom due to w/c won't fit.  Equipment used at home: bath bench, walker, rolling, wheelchair.  Upon discharge, patient will have assistance from unknown.    Objective:     Communicated with nurse prior to session.  Patient found supine with PureWick, telemetry  upon PT entry to room.    General Precautions: Standard, fall   Orthopedic Precautions:N/A   Braces: N/A     Exams:  · Cognitive Exam:  Patient is oriented to Person and Place  · Sensation:    · -       Intact  light/touch B LE  · RLE ROM: WFL except knee ext limited ~20 deg; ankle DF to neutral  · RLE Strength: Deficits: hip flex 4-/5; knee flex 3+/5; knee ext 3-/5; ankle DF 3-/5  · LLE ROM: WFL except knee ext limited ~15 deg; ankle DF to neutral  · LLE Strength: Deficits: hip flex 4-/5; knee ext 3-/5; knee flex 4/5; ankle DF 3-/5    Functional Mobility:  · Bed Mobility:     · Supine to Sit: minimum assistance  · Sit to Supine: minimum assistance  · Transfers:     · Sit to Stand:  minimum assistance with rolling walker from bed; moderate assist with RW from bedside chair  · Bedside chair to bed with no AD with moderate assist  · Gait: 4 steps to the bedside chair with RW with minimal assist. pt with difficulty lifting R LE due to pain and difficulty with weight bearing on the R LE due to pain    Therapeutic Activities and Exercises:   Pt sat on the EOB ~ 5 min with supervision prior to transfer as above    AM-PAC 6 CLICK MOBILITY  Total  Score:16     Patient left supine (pt remained up in bedside chair ~ 2 hours prior to return to bed as above) with all lines intact, call button in reach, bed alarm on and nurse notified.    GOALS:   Multidisciplinary Problems     Physical Therapy Goals        Problem: Physical Therapy Goal    Goal Priority Disciplines Outcome Goal Variances Interventions   Physical Therapy Goal     PT, PT/OT Ongoing, Progressing     Description:  PT goals until 1/20/20    1. Pt supine to sit with SBA-not met  2. Pt sit to supine with SBA-not met  3. Pt sit to stand with RW with SBA-not met  4. Pt to perform gait 15ft with RW with SBA.-not met  5. Pt to go up/down curb step with RW with CGA.-not met  6. Pt to transfer bed to/from bedside chair with RW with CGA.-not met  7. Pt to perform B LE exs in sitting or supine x 10 reps to strengthen B LE to improve functional mobility.-not met                      History:     Past Medical History:   Diagnosis Date    Diabetes mellitus     Hypertension        Past Surgical History:   Procedure Laterality Date    MASTECTOMY Left        Time Tracking:     PT Received On: 01/12/20  PT Start Time: 0920     PT Stop Time: 0950(time added due to PT returned to transfer pt back to bed)  PT Total Time (min): 30 min     Billable Minutes: Evaluation 15 and Therapeutic Activity 15      Karen Rivera, PT  01/12/2020

## 2020-01-12 NOTE — NURSING
Spoke with Dr Renteria. Explained that pt is saying that she isn't seeing things right and refused her pm meds.  said to explain to the pt that if she doesn't take her heparin she will need huong hose. Following instructions.       Pt took her heparin and her tylenol.

## 2020-01-12 NOTE — PROGRESS NOTES
[facility] Hospital Medicine  Progress Note    Patient Name: Debbie Dietz  MRN: [id number]  Patient Class: IP- Inpatient   Admission Date: 1/10/2020  Length of Stay: 2 days  Attending Physician: Javier Carrillo, *  Primary Care Provider: No primary care provider on file.    Hospital Medicine Team: [facility] HOSP MED A Javier Carrillo MD    Subjective:     Principal Problem:Type 2 diabetes mellitus with hypoglycemia, without long-term current use of insulin        HPI:  Ms. Dietz is a 89yo woman with HTN, DM2 not on insulin, and LE ulcer who presents with episode of hypoglycemia and slurred speech. She states that she developed bilateral leg swelling and venous stasis ulcers shortly after Thanksgiving. She was set up with home health and wound care with some improvement. 2 weeks ago, she had increase in bilateral swelling and was started on PO lasix. She notes that over the past week, she has started to have bilateral redness to the legs, as well as increased pain of right LE ulcer. She notes that over the past week, she feels like her glucose has been dropping occasionally, with episodes of shaking and feeling fatigued, which improve with eating. On evening prior to admission, she was having increased right leg pain and took an advil to help her sleep. On morning of admit, she felt generalized weakness and had slurred speech. She was able to contact her daughter with phone by bedside and EMS was called. Glucose found to be 59; she was given D50 with complete resolution of symptoms.    In the ED, she was afebrile with HR 90 and /110. Found to have WBC 12.9 with K 5.7 and Cr 1.5.    Overview/Hospital Course:  No notes on file    Interval History: reports hallucinations overnight and feeling that furniture was turning over. Pain is doing well overall but she was unable to sleep due to these hallucinations. She associates them with taking the antibiotic.    Review of Systems    Constitutional: Negative for fatigue and fever.   Respiratory: Negative for shortness of breath.    Cardiovascular: Positive for leg swelling. Negative for chest pain.   Gastrointestinal: Negative for nausea and vomiting.   Musculoskeletal: Positive for arthralgias.   Skin: Positive for rash and wound.   Neurological: Positive for weakness.   Psychiatric/Behavioral: Positive for hallucinations.     Objective:     Vital Signs (Most Recent):  Temp: 98 °F (36.7 °C) (01/12/20 1132)  Pulse: 77 (01/12/20 1459)  Resp: 18 (01/12/20 1132)  BP: (!) 177/77 (01/12/20 1459)  SpO2: 95 % (01/12/20 1132) Vital Signs (24h Range):  Temp:  [97.3 °F (36.3 °C)-98.5 °F (36.9 °C)] 98 °F (36.7 °C)  Pulse:  [64-78] 77  Resp:  [16-18] 18  SpO2:  [90 %-98 %] 95 %  BP: (135-199)/(65-82) 177/77     Weight: 62.1 kg (137 lb)  Body mass index is 22.11 kg/m².    Intake/Output Summary (Last 24 hours) at 1/12/2020 1551  Last data filed at 1/12/2020 0600  Gross per 24 hour   Intake 240 ml   Output 400 ml   Net -160 ml      Physical Exam   Constitutional: She is oriented to person, place, and time. She appears well-developed and well-nourished. No distress.   Frail   HENT:   Head: Normocephalic and atraumatic.   Nose: Nose normal.   Eyes: Pupils are equal, round, and reactive to light. EOM are normal. No scleral icterus.   Neck: Normal range of motion. No JVD present. No tracheal deviation present.   Cardiovascular: Normal rate, regular rhythm and normal heart sounds.   Pulmonary/Chest: Effort normal and breath sounds normal. No respiratory distress.   Abdominal: Soft. She exhibits no distension. There is no tenderness.   Musculoskeletal: She exhibits edema (bilateral 2+, improving) and tenderness. She exhibits no deformity.   Bilateral legs bandaged; see photos in ED note for ulcer picture   Neurological: She is alert and oriented to person, place, and time.   Skin: Skin is warm and dry. Rash noted. She is not diaphoretic. There is erythema  (bilateral, blanching).   Psychiatric: She has a normal mood and affect. Her behavior is normal.   Vitals reviewed.      Computed MELD-Na score unavailable. Necessary lab results were not found in the last year.  Computed MELD score unavailable. Necessary lab results were not found in the last year.    Significant Labs:  CBC:  Recent Labs   Lab 01/11/20  0333 01/12/20  0334   WBC 10.28 8.82   HGB 9.3* 10.0*   HCT 31.3* 31.1*    295     CMP:  Recent Labs   Lab 01/10/20  1735 01/11/20  0333 01/12/20  0334    138 139   K 5.6* 4.9 4.5    110 107   CO2 21* 20* 23   GLU 79 70 141*   BUN 40* 40* 37*   CREATININE 1.6* 1.6* 1.7*   CALCIUM 9.3 8.7 9.1   ANIONGAP 10 8 9   EGFRNONAA 27.6* 27.6* 25.6*     PTINR:  No results for input(s): INR in the last 48 hours.    Significant Procedures:   Dobutamine Stress Test with Color Flow: No results found for this or any previous visit.    None      Assessment/Plan:      * Type 2 diabetes mellitus with hypoglycemia, without long-term current use of insulin  - A1c 5.9  - on glimepiride at home; suspect developed hypoglycemia in the setting of infection with MARY due to infection/ARB/NSAID use  - LDSSI with accuchecks qachs while in house  - long term would likely benefit from alternate medication with less risk of hypoglycemia    Cellulitis of right lower extremity  - bilateral blanching erythema with leukocytosis and venous stasis ulcer of RLE  - given 500cc NS in ED  - initiated on vancomycin and zosyn in ED; switch to keflex for non-purulent superficial SSTI  - BCx NGTD and leukocytosis improving  - Wound Care consult  - lasix to decrease edema and aid healing of ulcer      MARY (acute kidney injury)  - unknown baseline but Cr 1.5 on admission with CrCl 21  - suspect due to infection + ARB + NSAID use + HTN vs baseline disease; less likely prerenal due to recent outpatient lasix initiation  - given 500cc NS in ED  - hold ARB and NSAIDs for now  - daily BMP  - mild  increase today; hold IV lasix for now, awaiting echo  - will obtain outpatient records tomorrow to see trend      Delirium  - episodes of delirium with hallucinations  - delirium precautions:    1) During the day, please open the shades and turn on the lights- If patient is in private room, please make sure they are close to the window.   2) Make sure the correct date and time is written on the whiteboard, as well as the current care team  3) Minimize interruptions at night-time, close shades and turn off TV. No vitals between 11PM and 5AM  4) When possible, during daytime make sure patient is in chair and provide activities that engage patient.  5) Please make sure patient has hearing aids and glasses while awake.    - prn haldol for agitation    Bilateral leg edema  - had been started on lasix 20mg daily for bilateral lower extremity edema  - likely 2/2 chronic venous stasis; BNP very mildly elevated at 100  - no TTE on file, will check  - resume home lasix 20mg daily      HLD (hyperlipidemia)  - stable  - continue home simvastatin      Primary osteoarthritis involving multiple joints  - worst in bilateral knee joints; patient has been using wheelchair due to pain  - uses prn APAP with rare advil usage at home  - continue prn APAP and can titrate medication as necessary; avoiding systemic NSAIDs given renal disease but could consider capsaicin, diclofenac cream, lidocaine      DNR (do not resuscitate)  - code discussion with patient and family at bedside; patient is DNR      Debility  - 2/2 severe bilateral arthritis, worse on right  - PT/OT recommend SNF  - family interested in placement following hospitalization, will discuss with CM      Essential hypertension  - hypertensive on admission  - holding home ARB due to MARY  - likely driven by pain and MARY  - increase to amlodipine 10; would favor conservative treatment with pain control and infection treatment      Venous stasis ulcer  - concern for superimposed  infection  - Wound Care consulted  - APAP prn pain, can consider escalation as warranted - would avoid systemic NSAID for now        VTE Risk Mitigation (From admission, onward)         Ordered     Place sequential compression device  Until discontinued      01/11/20 2105     heparin (porcine) injection 5,000 Units  Every 12 hours      01/10/20 1318     IP VTE HIGH RISK PATIENT  Once      01/10/20 1318                      Javier Carrillo MD  Department of Hospital Medicine   [facility]

## 2020-01-12 NOTE — SUBJECTIVE & OBJECTIVE
Interval History: reports hallucinations overnight and feeling that furniture was turning over. Pain is doing well overall but she was unable to sleep due to these hallucinations. She associates them with taking the antibiotic.    Review of Systems   Constitutional: Negative for fatigue and fever.   Respiratory: Negative for shortness of breath.    Cardiovascular: Positive for leg swelling. Negative for chest pain.   Gastrointestinal: Negative for nausea and vomiting.   Musculoskeletal: Positive for arthralgias.   Skin: Positive for rash and wound.   Neurological: Positive for weakness.   Psychiatric/Behavioral: Positive for hallucinations.     Objective:     Vital Signs (Most Recent):  Temp: 98 °F (36.7 °C) (01/12/20 1132)  Pulse: 77 (01/12/20 1459)  Resp: 18 (01/12/20 1132)  BP: (!) 177/77 (01/12/20 1459)  SpO2: 95 % (01/12/20 1132) Vital Signs (24h Range):  Temp:  [97.3 °F (36.3 °C)-98.5 °F (36.9 °C)] 98 °F (36.7 °C)  Pulse:  [64-78] 77  Resp:  [16-18] 18  SpO2:  [90 %-98 %] 95 %  BP: (135-199)/(65-82) 177/77     Weight: 62.1 kg (137 lb)  Body mass index is 22.11 kg/m².    Intake/Output Summary (Last 24 hours) at 1/12/2020 1551  Last data filed at 1/12/2020 0600  Gross per 24 hour   Intake 240 ml   Output 400 ml   Net -160 ml      Physical Exam   Constitutional: She is oriented to person, place, and time. She appears well-developed and well-nourished. No distress.   Frail   HENT:   Head: Normocephalic and atraumatic.   Nose: Nose normal.   Eyes: Pupils are equal, round, and reactive to light. EOM are normal. No scleral icterus.   Neck: Normal range of motion. No JVD present. No tracheal deviation present.   Cardiovascular: Normal rate, regular rhythm and normal heart sounds.   Pulmonary/Chest: Effort normal and breath sounds normal. No respiratory distress.   Abdominal: Soft. She exhibits no distension. There is no tenderness.   Musculoskeletal: She exhibits edema (bilateral 2+, improving) and tenderness. She  exhibits no deformity.   Bilateral legs bandaged; see photos in ED note for ulcer picture   Neurological: She is alert and oriented to person, place, and time.   Skin: Skin is warm and dry. Rash noted. She is not diaphoretic. There is erythema (bilateral, blanching).   Psychiatric: She has a normal mood and affect. Her behavior is normal.   Vitals reviewed.      Computed MELD-Na score unavailable. Necessary lab results were not found in the last year.  Computed MELD score unavailable. Necessary lab results were not found in the last year.    Significant Labs:  CBC:  Recent Labs   Lab 01/11/20  0333 01/12/20  0334   WBC 10.28 8.82   HGB 9.3* 10.0*   HCT 31.3* 31.1*    295     CMP:  Recent Labs   Lab 01/10/20  1735 01/11/20  0333 01/12/20  0334    138 139   K 5.6* 4.9 4.5    110 107   CO2 21* 20* 23   GLU 79 70 141*   BUN 40* 40* 37*   CREATININE 1.6* 1.6* 1.7*   CALCIUM 9.3 8.7 9.1   ANIONGAP 10 8 9   EGFRNONAA 27.6* 27.6* 25.6*     PTINR:  No results for input(s): INR in the last 48 hours.    Significant Procedures:   Dobutamine Stress Test with Color Flow: No results found for this or any previous visit.    None

## 2020-01-12 NOTE — ASSESSMENT & PLAN NOTE
- unknown baseline but Cr 1.5 on admission with CrCl 21  - suspect due to infection + ARB + NSAID use + HTN vs baseline disease; less likely prerenal due to recent outpatient lasix initiation  - given 500cc NS in ED  - hold ARB and NSAIDs for now  - daily BMP  - mild increase today; hold IV lasix for now, awaiting echo  - will obtain outpatient records tomorrow to see trend

## 2020-01-12 NOTE — ASSESSMENT & PLAN NOTE
- K 5.7 on admission, now declining; EKG w/o changes  - suspect 2/2 MARY due to infection/ARB/NSAID use  - given lasix 40mg IV x1 with improvement

## 2020-01-12 NOTE — PLAN OF CARE
Pt aaox4. occasionally confused to situation. complains of BLE pain upon movement. NSR on telemetry. family at bedside. Will continue to monitor and interventions as appropriate.

## 2020-01-12 NOTE — ASSESSMENT & PLAN NOTE
- bilateral blanching erythema with leukocytosis and venous stasis ulcer of RLE  - given 500cc NS in ED  - initiated on vancomycin and zosyn in ED; switch to keflex for non-purulent superficial SSTI  - BCx NGTD and leukocytosis improving  - Wound Care consult  - lasix to decrease edema and aid healing of ulcer

## 2020-01-12 NOTE — PROGRESS NOTES
Therapy with Vancomycin complete and consult discontinued by provider.  Pharmacy will sign off, please re-consult as needed.    Thank you,  Lenora Johnson, PharmD  PGY-1 Resident  [phone]

## 2020-01-12 NOTE — ASSESSMENT & PLAN NOTE
- hypertensive on admission  - holding home ARB due to MARY  - likely driven by pain and MARY  - increase to amlodipine 10; would favor conservative treatment with pain control and infection treatment

## 2020-01-13 PROBLEM — L97.811: Status: ACTIVE | Noted: 2020-01-10

## 2020-01-13 PROBLEM — I50.31 ACUTE DIASTOLIC HEART FAILURE: Status: ACTIVE | Noted: 2020-01-10

## 2020-01-13 PROBLEM — R23.9 ALTERATION IN SKIN INTEGRITY: Status: ACTIVE | Noted: 2020-01-13

## 2020-01-13 PROBLEM — L30.8 DERMATITIS ASSOCIATED WITH MOISTURE: Status: ACTIVE | Noted: 2020-01-13

## 2020-01-13 LAB
ANION GAP SERPL CALC-SCNC: 7 MMOL/L (ref 8–16)
BASOPHILS # BLD AUTO: 0.01 K/UL (ref 0–0.2)
BASOPHILS NFR BLD: 0.1 % (ref 0–1.9)
BUN SERPL-MCNC: 35 MG/DL (ref 10–30)
CALCIUM SERPL-MCNC: 8.9 MG/DL (ref 8.7–10.5)
CHLORIDE SERPL-SCNC: 107 MMOL/L (ref 95–110)
CO2 SERPL-SCNC: 26 MMOL/L (ref 23–29)
CREAT SERPL-MCNC: 1.6 MG/DL (ref 0.5–1.4)
DIFFERENTIAL METHOD: ABNORMAL
EOSINOPHIL # BLD AUTO: 0.3 K/UL (ref 0–0.5)
EOSINOPHIL NFR BLD: 3.8 % (ref 0–8)
ERYTHROCYTE [DISTWIDTH] IN BLOOD BY AUTOMATED COUNT: 15.4 % (ref 11.5–14.5)
EST. GFR  (AFRICAN AMERICAN): 31.8 ML/MIN/1.73 M^2
EST. GFR  (NON AFRICAN AMERICAN): 27.6 ML/MIN/1.73 M^2
GLUCOSE SERPL-MCNC: 106 MG/DL (ref 70–110)
HCT VFR BLD AUTO: 31.6 % (ref 37–48.5)
HGB BLD-MCNC: 9.4 G/DL (ref 12–16)
IMM GRANULOCYTES # BLD AUTO: 0.05 K/UL (ref 0–0.04)
IMM GRANULOCYTES NFR BLD AUTO: 0.6 % (ref 0–0.5)
LYMPHOCYTES # BLD AUTO: 1.2 K/UL (ref 1–4.8)
LYMPHOCYTES NFR BLD: 15.1 % (ref 18–48)
MAGNESIUM SERPL-MCNC: 1.8 MG/DL (ref 1.6–2.6)
MCH RBC QN AUTO: 30.6 PG (ref 27–31)
MCHC RBC AUTO-ENTMCNC: 29.7 G/DL (ref 32–36)
MCV RBC AUTO: 103 FL (ref 82–98)
MONOCYTES # BLD AUTO: 0.8 K/UL (ref 0.3–1)
MONOCYTES NFR BLD: 9.8 % (ref 4–15)
NEUTROPHILS # BLD AUTO: 5.6 K/UL (ref 1.8–7.7)
NEUTROPHILS NFR BLD: 70.6 % (ref 38–73)
NRBC BLD-RTO: 0 /100 WBC
PHOSPHATE SERPL-MCNC: 3.7 MG/DL (ref 2.7–4.5)
PLATELET # BLD AUTO: 280 K/UL (ref 150–350)
PMV BLD AUTO: 9.3 FL (ref 9.2–12.9)
POCT GLUCOSE: 107 MG/DL (ref 70–110)
POCT GLUCOSE: 194 MG/DL (ref 70–110)
POCT GLUCOSE: 221 MG/DL (ref 70–110)
POCT GLUCOSE: 249 MG/DL (ref 70–110)
POTASSIUM SERPL-SCNC: 4.5 MMOL/L (ref 3.5–5.1)
RBC # BLD AUTO: 3.07 M/UL (ref 4–5.4)
SODIUM SERPL-SCNC: 140 MMOL/L (ref 136–145)
WBC # BLD AUTO: 7.94 K/UL (ref 3.9–12.7)

## 2020-01-13 PROCEDURE — 30200315 PPD INTRADERMAL TEST REV CODE 302: Performed by: HOSPITALIST

## 2020-01-13 PROCEDURE — 94761 N-INVAS EAR/PLS OXIMETRY MLT: CPT

## 2020-01-13 PROCEDURE — 85025 COMPLETE CBC W/AUTO DIFF WBC: CPT

## 2020-01-13 PROCEDURE — 99233 PR SUBSEQUENT HOSPITAL CARE,LEVL III: ICD-10-PCS | Mod: ,,, | Performed by: HOSPITALIST

## 2020-01-13 PROCEDURE — 84100 ASSAY OF PHOSPHORUS: CPT

## 2020-01-13 PROCEDURE — 36415 COLL VENOUS BLD VENIPUNCTURE: CPT

## 2020-01-13 PROCEDURE — 83735 ASSAY OF MAGNESIUM: CPT

## 2020-01-13 PROCEDURE — 80048 BASIC METABOLIC PNL TOTAL CA: CPT

## 2020-01-13 PROCEDURE — 25000003 PHARM REV CODE 250: Performed by: HOSPITALIST

## 2020-01-13 PROCEDURE — 99233 SBSQ HOSP IP/OBS HIGH 50: CPT | Mod: ,,, | Performed by: HOSPITALIST

## 2020-01-13 PROCEDURE — 63600175 PHARM REV CODE 636 W HCPCS: Performed by: HOSPITALIST

## 2020-01-13 PROCEDURE — 11000001 HC ACUTE MED/SURG PRIVATE ROOM

## 2020-01-13 PROCEDURE — 86580 TB INTRADERMAL TEST: CPT | Performed by: HOSPITALIST

## 2020-01-13 RX ORDER — FUROSEMIDE 10 MG/ML
40 INJECTION INTRAMUSCULAR; INTRAVENOUS 2 TIMES DAILY
Status: DISCONTINUED | OUTPATIENT
Start: 2020-01-13 | End: 2020-01-13

## 2020-01-13 RX ORDER — CEFEPIME HYDROCHLORIDE 2 G/1
2 INJECTION, POWDER, FOR SOLUTION INTRAVENOUS ONCE
Status: COMPLETED | OUTPATIENT
Start: 2020-01-13 | End: 2020-01-13

## 2020-01-13 RX ORDER — FUROSEMIDE 10 MG/ML
40 INJECTION INTRAMUSCULAR; INTRAVENOUS 2 TIMES DAILY
Status: DISCONTINUED | OUTPATIENT
Start: 2020-01-13 | End: 2020-01-14

## 2020-01-13 RX ORDER — CEFEPIME HYDROCHLORIDE 1 G/1
1 INJECTION, POWDER, FOR SOLUTION INTRAMUSCULAR; INTRAVENOUS
Status: DISCONTINUED | OUTPATIENT
Start: 2020-01-14 | End: 2020-01-15 | Stop reason: HOSPADM

## 2020-01-13 RX ORDER — FUROSEMIDE 10 MG/ML
40 INJECTION INTRAMUSCULAR; INTRAVENOUS DAILY
Status: DISCONTINUED | OUTPATIENT
Start: 2020-01-14 | End: 2020-01-13

## 2020-01-13 RX ADMIN — FUROSEMIDE 40 MG: 10 INJECTION, SOLUTION INTRAMUSCULAR; INTRAVENOUS at 06:01

## 2020-01-13 RX ADMIN — FUROSEMIDE 40 MG: 10 INJECTION, SOLUTION INTRAMUSCULAR; INTRAVENOUS at 09:01

## 2020-01-13 RX ADMIN — Medication 5 UNITS: at 12:01

## 2020-01-13 RX ADMIN — HEPARIN SODIUM 5000 UNITS: 5000 INJECTION, SOLUTION INTRAVENOUS; SUBCUTANEOUS at 09:01

## 2020-01-13 RX ADMIN — CEFEPIME 2 G: 2 INJECTION, POWDER, FOR SOLUTION INTRAVENOUS at 12:01

## 2020-01-13 RX ADMIN — MICONAZOLE NITRATE: 20 OINTMENT TOPICAL at 09:01

## 2020-01-13 RX ADMIN — CEPHALEXIN 500 MG: 500 CAPSULE ORAL at 06:01

## 2020-01-13 RX ADMIN — INSULIN ASPART 1 UNITS: 100 INJECTION, SOLUTION INTRAVENOUS; SUBCUTANEOUS at 09:01

## 2020-01-13 RX ADMIN — AMLODIPINE BESYLATE 10 MG: 10 TABLET ORAL at 09:01

## 2020-01-13 RX ADMIN — SIMVASTATIN 20 MG: 20 TABLET, FILM COATED ORAL at 09:01

## 2020-01-13 NOTE — PLAN OF CARE
01/13/20 1057   Discharge Assessment   Assessment Type Discharge Planning Assessment   Confirmed/corrected address and phone number on facesheet? Yes   Assessment information obtained from? Patient   Expected Length of Stay (days) 3   Communicated expected length of stay with patient/caregiver yes   Prior to hospitilization cognitive status: Alert/Oriented   Prior to hospitalization functional status: Assistive Equipment   Current cognitive status: Alert/Oriented   Current Functional Status: Assistive Equipment   Lives With child(shanique), adult   Able to Return to Prior Arrangements no   Readmission Within the Last 30 Days no previous admission in last 30 days   Equipment Currently Used at Home walker, rolling;wheelchair;bath bench   Do you have any problems affording any of your prescribed medications? No   Is the patient taking medications as prescribed? yes   Discharge Plan A Skilled Nursing Facility   Discharge Plan B Home Health   Patient/Family in Agreement with Plan yes

## 2020-01-13 NOTE — ASSESSMENT & PLAN NOTE
- had been started on lasix 20mg daily for bilateral lower extremity edema  - BNP very mildly elevated at 100  - TTE with normal LVEF, G1DD, ePAP 48 with CVP 8  - lasix 40mg IV bid  - strict I/Os, daily weights

## 2020-01-13 NOTE — ASSESSMENT & PLAN NOTE
- OSH records indicate Cr 1.3 on 5/19 and 1.13 4/18; Cr 1.5 on admission with CrCl 21  - suspect due to infection + ARB + NSAID use + HTN vs baseline disease; less likely prerenal due to recent outpatient lasix initiation  - given 500cc NS in ED  - hold ARB and NSAIDs for now  - daily BMP

## 2020-01-13 NOTE — ASSESSMENT & PLAN NOTE
- concern for superimposed infection with pseudomonas  - Wound Care consulted  - APAP prn pain, can consider escalation as warranted - would avoid systemic NSAID for now  - check KARINE

## 2020-01-13 NOTE — ASSESSMENT & PLAN NOTE
- bilateral blanching erythema with leukocytosis and venous stasis ulcer of RLE  - given 500cc NS in ED  - initiated on vancomycin and zosyn in ED; switch to cefepime for pseudomonas coverage; can likely de-escalate to cipro once ready for d/c  - BCx NGTD and leukocytosis improving  - Wound Care consult  - lasix to decrease edema and aid healing of ulcer

## 2020-01-13 NOTE — PLAN OF CARE
01/13/20 0859   Post-Acute Status   Post-Acute Authorization Placement   Post-Acute Placement Status Patient List Provided     Sw did place call to [facility] at [phone], family prefers [facility] in [place]. Sw to make referral and follow.

## 2020-01-13 NOTE — SUBJECTIVE & OBJECTIVE
Interval History: hallucinations improved, although still present, and she was able to sleep better. Leg pain is stable but wound with exudate per wound care today. Will switch abx.    Review of Systems   Constitutional: Negative for fatigue and fever.   Respiratory: Negative for shortness of breath.    Cardiovascular: Positive for leg swelling. Negative for chest pain.   Gastrointestinal: Negative for nausea and vomiting.   Musculoskeletal: Positive for arthralgias.   Skin: Positive for rash and wound.   Neurological: Positive for weakness.   Psychiatric/Behavioral: Positive for hallucinations.     Objective:     Vital Signs (Most Recent):  Temp: 96.5 °F (35.8 °C) (01/13/20 1052)  Pulse: 71 (01/13/20 1500)  Resp: 18 (01/13/20 1052)  BP: (!) 169/74 (01/13/20 1052)  SpO2: (!) 93 % (01/13/20 1052) Vital Signs (24h Range):  Temp:  [96.1 °F (35.6 °C)-98.1 °F (36.7 °C)] 96.5 °F (35.8 °C)  Pulse:  [] 71  Resp:  [16-18] 18  SpO2:  [93 %-99 %] 93 %  BP: (128-185)/(70-79) 169/74     Weight: 62.1 kg (137 lb)  Body mass index is 22.11 kg/m².    Intake/Output Summary (Last 24 hours) at 1/13/2020 1620  Last data filed at 1/12/2020 2300  Gross per 24 hour   Intake --   Output 400 ml   Net -400 ml      Physical Exam   Constitutional: She is oriented to person, place, and time. She appears well-developed and well-nourished. No distress.   Frail   HENT:   Head: Normocephalic and atraumatic.   Nose: Nose normal.   Eyes: Pupils are equal, round, and reactive to light. EOM are normal. No scleral icterus.   Neck: Normal range of motion. No JVD present. No tracheal deviation present.   Cardiovascular: Normal rate, regular rhythm and normal heart sounds.   Pulmonary/Chest: Effort normal and breath sounds normal. No respiratory distress.   Abdominal: Soft. She exhibits no distension. There is no tenderness.   Musculoskeletal: She exhibits edema (bilateral 2+, improving) and tenderness. She exhibits no deformity.   Bilateral legs  bandaged; see photos in ED note for ulcer picture   Neurological: She is alert and oriented to person, place, and time.   Skin: Skin is warm and dry. Rash noted. She is not diaphoretic. There is erythema (bilateral, blanching).   Psychiatric: She has a normal mood and affect. Her behavior is normal.   Vitals reviewed.      Computed MELD-Na score unavailable. Necessary lab results were not found in the last year.  Computed MELD score unavailable. Necessary lab results were not found in the last year.    Significant Labs:  CBC:  Recent Labs   Lab 01/12/20  0334 01/13/20  0434   WBC 8.82 7.94   HGB 10.0* 9.4*   HCT 31.1* 31.6*    280     CMP:  Recent Labs   Lab 01/12/20  0334 01/13/20  0434    140   K 4.5 4.5    107   CO2 23 26   * 106   BUN 37* 35*   CREATININE 1.7* 1.6*   CALCIUM 9.1 8.9   ANIONGAP 9 7*   EGFRNONAA 25.6* 27.6*     PTINR:  No results for input(s): INR in the last 48 hours.    Significant Procedures:   Dobutamine Stress Test with Color Flow: No results found for this or any previous visit.    None

## 2020-01-13 NOTE — PROGRESS NOTES
Pt refusing KARINE. Notified Vascular Lab, and Lab stated that they will try to do procedure at bedside tomorrow. MD pagenabila.  Will continue to monitor.

## 2020-01-13 NOTE — PROGRESS NOTES
Wound care consult for BLEs.       PMH:      Patient states she has had wounds to the BLEs with edema for past several months. Legs are tender. Right more tender then left. Patient states she was at home from pain in her legs, pain is relieved by dangling.    Small wound to the left shin approx 2.5 x 1.2 x 0.1 cm. Fibrinous base, mild periwound erythema.       Wound/scab to the left foot approx 2 x 1 cm, appears stable and healing. Sween applied.  Wound to the right shin approx 9 x 2.5 x 0.1 cm. Malodor smell with greenish drainage, smells like pseudomonas. Periwound erythemic and tender.    Posterior right leg wound approx 3 x 1 cm. Slough to base, moderate drainage.     All wounds cleansed with Vashe. Hydrofera blue placed over open wounds and secured with kerlex.    Discussed cellulitis/odor with Dr Carrillo, also recommend arterial studies for rest pain.     Mild moisture dermatitis to the gluteal cleft and perineal area.     Recommend;  Barrier antifungal cream BID to the perineal area  EHOB overlay  Heels offloaded  Hydrofera blue every other day to the leg wounds  Arterial studies    Wound care to follow PRN.  Madai Rizo RN CN [facility]   [phone]

## 2020-01-13 NOTE — PLAN OF CARE
Plan of care discussed with the pt. Pt was asking for a DC date. Explained that her Dr will discuss that with her. All questions answered and encouraged. Pt has been free of falls and injury. Pt required a PRN respiratory tx.

## 2020-01-13 NOTE — PROGRESS NOTES
[facility] Hospital Medicine  Progress Note    Patient Name: Debbie Dietz  MRN: [id number]  Patient Class: IP- Inpatient   Admission Date: 1/10/2020  Length of Stay: 3 days  Attending Physician: Javier Carrillo, *  Primary Care Provider: Yusuf Ewing MD    Hospital Medicine Team: [facility] HOSP MED A Javier Carrillo MD    Subjective:     Principal Problem:Type 2 diabetes mellitus with hypoglycemia, without long-term current use of insulin        HPI:  Ms. Dietz is a 89yo woman with HTN, DM2 not on insulin, and LE ulcer who presents with episode of hypoglycemia and slurred speech. She states that she developed bilateral leg swelling and venous stasis ulcers shortly after Thanksgiving. She was set up with home health and wound care with some improvement. 2 weeks ago, she had increase in bilateral swelling and was started on PO lasix. She notes that over the past week, she has started to have bilateral redness to the legs, as well as increased pain of right LE ulcer. She notes that over the past week, she feels like her glucose has been dropping occasionally, with episodes of shaking and feeling fatigued, which improve with eating. On evening prior to admission, she was having increased right leg pain and took an advil to help her sleep. On morning of admit, she felt generalized weakness and had slurred speech. She was able to contact her daughter with phone by bedside and EMS was called. Glucose found to be 59; she was given D50 with complete resolution of symptoms.    In the ED, she was afebrile with HR 90 and /110. Found to have WBC 12.9 with K 5.7 and Cr 1.5.    Overview/Hospital Course:  No notes on file    Interval History: hallucinations improved, although still present, and she was able to sleep better. Leg pain is stable but wound with exudate per wound care today. Will switch abx.    Review of Systems   Constitutional: Negative for fatigue and fever.   Respiratory:  Negative for shortness of breath.    Cardiovascular: Positive for leg swelling. Negative for chest pain.   Gastrointestinal: Negative for nausea and vomiting.   Musculoskeletal: Positive for arthralgias.   Skin: Positive for rash and wound.   Neurological: Positive for weakness.   Psychiatric/Behavioral: Positive for hallucinations.     Objective:     Vital Signs (Most Recent):  Temp: 96.5 °F (35.8 °C) (01/13/20 1052)  Pulse: 71 (01/13/20 1500)  Resp: 18 (01/13/20 1052)  BP: (!) 169/74 (01/13/20 1052)  SpO2: (!) 93 % (01/13/20 1052) Vital Signs (24h Range):  Temp:  [96.1 °F (35.6 °C)-98.1 °F (36.7 °C)] 96.5 °F (35.8 °C)  Pulse:  [] 71  Resp:  [16-18] 18  SpO2:  [93 %-99 %] 93 %  BP: (128-185)/(70-79) 169/74     Weight: 62.1 kg (137 lb)  Body mass index is 22.11 kg/m².    Intake/Output Summary (Last 24 hours) at 1/13/2020 1620  Last data filed at 1/12/2020 2300  Gross per 24 hour   Intake --   Output 400 ml   Net -400 ml      Physical Exam   Constitutional: She is oriented to person, place, and time. She appears well-developed and well-nourished. No distress.   Frail   HENT:   Head: Normocephalic and atraumatic.   Nose: Nose normal.   Eyes: Pupils are equal, round, and reactive to light. EOM are normal. No scleral icterus.   Neck: Normal range of motion. No JVD present. No tracheal deviation present.   Cardiovascular: Normal rate, regular rhythm and normal heart sounds.   Pulmonary/Chest: Effort normal and breath sounds normal. No respiratory distress.   Abdominal: Soft. She exhibits no distension. There is no tenderness.   Musculoskeletal: She exhibits edema (bilateral 2+, improving) and tenderness. She exhibits no deformity.   Bilateral legs bandaged; see photos in ED note for ulcer picture   Neurological: She is alert and oriented to person, place, and time.   Skin: Skin is warm and dry. Rash noted. She is not diaphoretic. There is erythema (bilateral, blanching).   Psychiatric: She has a normal mood and  affect. Her behavior is normal.   Vitals reviewed.      Computed MELD-Na score unavailable. Necessary lab results were not found in the last year.  Computed MELD score unavailable. Necessary lab results were not found in the last year.    Significant Labs:  CBC:  Recent Labs   Lab 01/12/20  0334 01/13/20  0434   WBC 8.82 7.94   HGB 10.0* 9.4*   HCT 31.1* 31.6*    280     CMP:  Recent Labs   Lab 01/12/20  0334 01/13/20  0434    140   K 4.5 4.5    107   CO2 23 26   * 106   BUN 37* 35*   CREATININE 1.7* 1.6*   CALCIUM 9.1 8.9   ANIONGAP 9 7*   EGFRNONAA 25.6* 27.6*     PTINR:  No results for input(s): INR in the last 48 hours.    Significant Procedures:   Dobutamine Stress Test with Color Flow: No results found for this or any previous visit.    None      Assessment/Plan:      * Type 2 diabetes mellitus with hypoglycemia, without long-term current use of insulin  - A1c 5.9  - on glimepiride at home; suspect developed hypoglycemia in the setting of infection with MARY due to infection/ARB/NSAID use  - LDSSI with accuchecks qachs while in house  - long term would likely benefit from alternate medication with less risk of hypoglycemia    Cellulitis of right lower extremity  - bilateral blanching erythema with leukocytosis and venous stasis ulcer of RLE  - given 500cc NS in ED  - initiated on vancomycin and zosyn in ED; switch to cefepime for pseudomonas coverage; can likely de-escalate to cipro once ready for d/c  - BCx NGTD and leukocytosis improving  - Wound Care consult  - lasix to decrease edema and aid healing of ulcer      MARY (acute kidney injury)  - OSH records indicate Cr 1.3 on 5/19 and 1.13 4/18; Cr 1.5 on admission with CrCl 21  - suspect due to infection + ARB + NSAID use + HTN vs baseline disease; less likely prerenal due to recent outpatient lasix initiation  - given 500cc NS in ED  - hold ARB and NSAIDs for now  - daily BMP      Delirium  - episodes of delirium with  hallucinations  - delirium precautions:    1) During the day, please open the shades and turn on the lights- If patient is in private room, please make sure they are close to the window.   2) Make sure the correct date and time is written on the whiteboard, as well as the current care team  3) Minimize interruptions at night-time, close shades and turn off TV. No vitals between 11PM and 5AM  4) When possible, during daytime make sure patient is in chair and provide activities that engage patient.  5) Please make sure patient has hearing aids and glasses while awake.    - prn haldol for agitation    Osteoarthritis of right knee        Acute diastolic heart failure  - had been started on lasix 20mg daily for bilateral lower extremity edema  - BNP very mildly elevated at 100  - TTE with normal LVEF, G1DD, ePAP 48 with CVP 8  - lasix 40mg IV bid  - strict I/Os, daily weights      HLD (hyperlipidemia)  - stable  - continue home simvastatin      Primary osteoarthritis involving multiple joints  - worst in bilateral knee joints; patient has been using wheelchair due to pain  - uses prn APAP with rare advil usage at home  - continue prn APAP and can titrate medication as necessary; avoiding systemic NSAIDs given renal disease but could consider capsaicin, diclofenac cream, lidocaine      DNR (do not resuscitate)  - code discussion with patient and family at bedside; patient is DNR      Debility  - 2/2 severe bilateral arthritis, worse on right  - PT/OT recommend SNF  - family interested in placement following hospitalization, will discuss with CM      Essential hypertension  - hypertensive on admission  - holding home ARB due to MARY  - likely driven by pain and MARY  - increase to amlodipine 10; would favor conservative treatment with pain control and infection treatment      Skin ulcer of right pretibial region limited to breakdown of skin  - concern for superimposed infection with pseudomonas  - Wound Care consulted  -  APAP prn pain, can consider escalation as warranted - would avoid systemic NSAID for now  - check KARINE        VTE Risk Mitigation (From admission, onward)         Ordered     Place sequential compression device  Until discontinued      01/11/20 2105     heparin (porcine) injection 5,000 Units  Every 12 hours      01/10/20 1318     IP VTE HIGH RISK PATIENT  Once      01/10/20 1318                      Javier Carrillo MD  Department of Hospital Medicine   [facility]

## 2020-01-14 LAB
ANION GAP SERPL CALC-SCNC: 8 MMOL/L (ref 8–16)
BASOPHILS # BLD AUTO: 0.02 K/UL (ref 0–0.2)
BASOPHILS NFR BLD: 0.2 % (ref 0–1.9)
BUN SERPL-MCNC: 36 MG/DL (ref 10–30)
CALCIUM SERPL-MCNC: 8.9 MG/DL (ref 8.7–10.5)
CHLORIDE SERPL-SCNC: 105 MMOL/L (ref 95–110)
CO2 SERPL-SCNC: 27 MMOL/L (ref 23–29)
CREAT SERPL-MCNC: 1.6 MG/DL (ref 0.5–1.4)
DIFFERENTIAL METHOD: ABNORMAL
EOSINOPHIL # BLD AUTO: 0.3 K/UL (ref 0–0.5)
EOSINOPHIL NFR BLD: 3 % (ref 0–8)
ERYTHROCYTE [DISTWIDTH] IN BLOOD BY AUTOMATED COUNT: 15.5 % (ref 11.5–14.5)
EST. GFR  (AFRICAN AMERICAN): 31.8 ML/MIN/1.73 M^2
EST. GFR  (NON AFRICAN AMERICAN): 27.6 ML/MIN/1.73 M^2
GLUCOSE SERPL-MCNC: 109 MG/DL (ref 70–110)
HCT VFR BLD AUTO: 30.9 % (ref 37–48.5)
HGB BLD-MCNC: 9.7 G/DL (ref 12–16)
IMM GRANULOCYTES # BLD AUTO: 0.06 K/UL (ref 0–0.04)
IMM GRANULOCYTES NFR BLD AUTO: 0.7 % (ref 0–0.5)
LYMPHOCYTES # BLD AUTO: 1.4 K/UL (ref 1–4.8)
LYMPHOCYTES NFR BLD: 15.6 % (ref 18–48)
MAGNESIUM SERPL-MCNC: 1.9 MG/DL (ref 1.6–2.6)
MCH RBC QN AUTO: 31.7 PG (ref 27–31)
MCHC RBC AUTO-ENTMCNC: 31.4 G/DL (ref 32–36)
MCV RBC AUTO: 101 FL (ref 82–98)
MONOCYTES # BLD AUTO: 0.8 K/UL (ref 0.3–1)
MONOCYTES NFR BLD: 9.2 % (ref 4–15)
NEUTROPHILS # BLD AUTO: 6.4 K/UL (ref 1.8–7.7)
NEUTROPHILS NFR BLD: 71.3 % (ref 38–73)
NRBC BLD-RTO: 0 /100 WBC
PHOSPHATE SERPL-MCNC: 4.2 MG/DL (ref 2.7–4.5)
PLATELET # BLD AUTO: 279 K/UL (ref 150–350)
PMV BLD AUTO: 9.5 FL (ref 9.2–12.9)
POCT GLUCOSE: 123 MG/DL (ref 70–110)
POCT GLUCOSE: 172 MG/DL (ref 70–110)
POCT GLUCOSE: 203 MG/DL (ref 70–110)
POCT GLUCOSE: 259 MG/DL (ref 70–110)
POTASSIUM SERPL-SCNC: 4.1 MMOL/L (ref 3.5–5.1)
RBC # BLD AUTO: 3.06 M/UL (ref 4–5.4)
SODIUM SERPL-SCNC: 140 MMOL/L (ref 136–145)
WBC # BLD AUTO: 9 K/UL (ref 3.9–12.7)

## 2020-01-14 PROCEDURE — 11000001 HC ACUTE MED/SURG PRIVATE ROOM

## 2020-01-14 PROCEDURE — 83735 ASSAY OF MAGNESIUM: CPT

## 2020-01-14 PROCEDURE — 85025 COMPLETE CBC W/AUTO DIFF WBC: CPT

## 2020-01-14 PROCEDURE — 97116 GAIT TRAINING THERAPY: CPT

## 2020-01-14 PROCEDURE — 99233 PR SUBSEQUENT HOSPITAL CARE,LEVL III: ICD-10-PCS | Mod: ,,, | Performed by: HOSPITALIST

## 2020-01-14 PROCEDURE — 97530 THERAPEUTIC ACTIVITIES: CPT

## 2020-01-14 PROCEDURE — 25000003 PHARM REV CODE 250: Performed by: HOSPITALIST

## 2020-01-14 PROCEDURE — 63600175 PHARM REV CODE 636 W HCPCS: Performed by: HOSPITALIST

## 2020-01-14 PROCEDURE — 80048 BASIC METABOLIC PNL TOTAL CA: CPT

## 2020-01-14 PROCEDURE — 36415 COLL VENOUS BLD VENIPUNCTURE: CPT

## 2020-01-14 PROCEDURE — 97535 SELF CARE MNGMENT TRAINING: CPT

## 2020-01-14 PROCEDURE — 84100 ASSAY OF PHOSPHORUS: CPT

## 2020-01-14 PROCEDURE — 93922 UPR/L XTREMITY ART 2 LEVELS: CPT | Performed by: SURGERY

## 2020-01-14 PROCEDURE — 99233 SBSQ HOSP IP/OBS HIGH 50: CPT | Mod: ,,, | Performed by: HOSPITALIST

## 2020-01-14 RX ORDER — SIMVASTATIN 20 MG/1
20 TABLET, FILM COATED ORAL NIGHTLY
Qty: 90 TABLET | Refills: 3
Start: 2020-01-14 | End: 2023-10-31 | Stop reason: CLARIF

## 2020-01-14 RX ORDER — FUROSEMIDE 40 MG/1
40 TABLET ORAL 2 TIMES DAILY
Qty: 60 TABLET | Refills: 11
Start: 2020-01-15 | End: 2023-10-31 | Stop reason: CLARIF

## 2020-01-14 RX ORDER — CIPROFLOXACIN 500 MG/1
500 TABLET ORAL DAILY
Start: 2020-01-14 | End: 2023-10-31 | Stop reason: CLARIF

## 2020-01-14 RX ORDER — ACETAMINOPHEN 500 MG
1000 TABLET ORAL EVERY 8 HOURS PRN
Refills: 0
Start: 2020-01-14 | End: 2023-10-31 | Stop reason: CLARIF

## 2020-01-14 RX ORDER — HALOPERIDOL 1 MG/1
1 TABLET ORAL EVERY 8 HOURS PRN
Start: 2020-01-14 | End: 2023-10-31 | Stop reason: CLARIF

## 2020-01-14 RX ORDER — AMLODIPINE BESYLATE 10 MG/1
10 TABLET ORAL DAILY
Qty: 30 TABLET | Refills: 11
Start: 2020-01-15 | End: 2023-10-31 | Stop reason: CLARIF

## 2020-01-14 RX ORDER — FUROSEMIDE 40 MG/1
40 TABLET ORAL 2 TIMES DAILY
Status: DISCONTINUED | OUTPATIENT
Start: 2020-01-14 | End: 2020-01-15

## 2020-01-14 RX ORDER — INSULIN ASPART 100 [IU]/ML
0-5 INJECTION, SOLUTION INTRAVENOUS; SUBCUTANEOUS
Refills: 0
Start: 2020-01-14 | End: 2023-10-31 | Stop reason: CLARIF

## 2020-01-14 RX ADMIN — FUROSEMIDE 40 MG: 10 INJECTION, SOLUTION INTRAMUSCULAR; INTRAVENOUS at 10:01

## 2020-01-14 RX ADMIN — MICONAZOLE NITRATE: 20 OINTMENT TOPICAL at 10:01

## 2020-01-14 RX ADMIN — MICONAZOLE NITRATE: 20 OINTMENT TOPICAL at 08:01

## 2020-01-14 RX ADMIN — HEPARIN SODIUM 5000 UNITS: 5000 INJECTION, SOLUTION INTRAVENOUS; SUBCUTANEOUS at 08:01

## 2020-01-14 RX ADMIN — SIMVASTATIN 20 MG: 20 TABLET, FILM COATED ORAL at 08:01

## 2020-01-14 RX ADMIN — INSULIN ASPART 3 UNITS: 100 INJECTION, SOLUTION INTRAVENOUS; SUBCUTANEOUS at 05:01

## 2020-01-14 RX ADMIN — AMLODIPINE BESYLATE 10 MG: 10 TABLET ORAL at 10:01

## 2020-01-14 RX ADMIN — CEFEPIME 1 G: 1 INJECTION, POWDER, FOR SOLUTION INTRAMUSCULAR; INTRAVENOUS at 12:01

## 2020-01-14 RX ADMIN — FUROSEMIDE 40 MG: 40 TABLET ORAL at 05:01

## 2020-01-14 RX ADMIN — HEPARIN SODIUM 5000 UNITS: 5000 INJECTION, SOLUTION INTRAVENOUS; SUBCUTANEOUS at 10:01

## 2020-01-14 NOTE — ASSESSMENT & PLAN NOTE
- bilateral blanching erythema with leukocytosis and venous stasis ulcer of RLE  - given 500cc NS in ED  - initiated on vancomycin and zosyn in ED; switch to cefepime for pseudomonas coverage; can likely de-escalate to cipro once ready for d/c  - BCx NGTD and leukocytosis improving  - Wound Care following  - lasix to decrease edema and aid healing of ulcer

## 2020-01-14 NOTE — PLAN OF CARE
01/14/20 0911   Post-Acute Status   Post-Acute Authorization Placement   Post-Acute Placement Status Awaiting Internal Medical Clearance     Sw placed call to [facility] at , spoke with Glenn Funk able to have a bed tomorrow, Sw to have SNF orders completed, call locet and fax pasrr.

## 2020-01-14 NOTE — PROGRESS NOTES
[facility] Hospital Medicine  Progress Note    Patient Name: Debbie Dietz  MRN: [id number]  Patient Class: IP- Inpatient   Admission Date: 1/10/2020  Length of Stay: 4 days  Attending Physician: Javier Carrillo, *  Primary Care Provider: Yusuf Ewing MD    Hospital Medicine Team: [facility] HOSP MED A Javier Carrillo MD    Subjective:     Principal Problem:Type 2 diabetes mellitus with hypoglycemia, without long-term current use of insulin        HPI:  Ms. Dietz is a 89yo woman with HTN, DM2 not on insulin, and LE ulcer who presents with episode of hypoglycemia and slurred speech. She states that she developed bilateral leg swelling and venous stasis ulcers shortly after Thanksgiving. She was set up with home health and wound care with some improvement. 2 weeks ago, she had increase in bilateral swelling and was started on PO lasix. She notes that over the past week, she has started to have bilateral redness to the legs, as well as increased pain of right LE ulcer. She notes that over the past week, she feels like her glucose has been dropping occasionally, with episodes of shaking and feeling fatigued, which improve with eating. On evening prior to admission, she was having increased right leg pain and took an advil to help her sleep. On morning of admit, she felt generalized weakness and had slurred speech. She was able to contact her daughter with phone by bedside and EMS was called. Glucose found to be 59; she was given D50 with complete resolution of symptoms.    In the ED, she was afebrile with HR 90 and /110. Found to have WBC 12.9 with K 5.7 and Cr 1.5.    Overview/Hospital Course:  No notes on file    Interval History: hallucinations improved. LE edema improving but still present, worse left than right. Discussed SNF placement and need to go directly from hospital to SNF rather than being able to stop at home first.    Review of Systems   Constitutional: Negative for  fatigue and fever.   Respiratory: Negative for shortness of breath.    Cardiovascular: Positive for leg swelling. Negative for chest pain.   Gastrointestinal: Negative for nausea and vomiting.   Musculoskeletal: Positive for arthralgias.   Skin: Positive for rash and wound.   Neurological: Positive for weakness.   Psychiatric/Behavioral: Negative for hallucinations.     Objective:     Vital Signs (Most Recent):  Temp: 97.9 °F (36.6 °C) (01/14/20 1300)  Pulse: 80 (01/14/20 1300)  Resp: 14 (01/14/20 1300)  BP: (!) 146/66 (01/14/20 1300)  SpO2: 96 % (01/14/20 1300) Vital Signs (24h Range):  Temp:  [97.3 °F (36.3 °C)-98 °F (36.7 °C)] 97.9 °F (36.6 °C)  Pulse:  [55-93] 80  Resp:  [14-16] 14  SpO2:  [93 %-96 %] 96 %  BP: (139-168)/(65-73) 146/66     Weight: 62.1 kg (137 lb)  Body mass index is 22.11 kg/m².  No intake or output data in the 24 hours ending 01/14/20 1314   Physical Exam   Constitutional: She is oriented to person, place, and time. She appears well-developed and well-nourished. No distress.   Frail   HENT:   Head: Normocephalic and atraumatic.   Nose: Nose normal.   Eyes: Pupils are equal, round, and reactive to light. EOM are normal. No scleral icterus.   Neck: Normal range of motion. No JVD present. No tracheal deviation present.   Cardiovascular: Normal rate, regular rhythm and normal heart sounds.   Pulmonary/Chest: Effort normal and breath sounds normal. No respiratory distress.   Abdominal: Soft. She exhibits no distension. There is no tenderness.   Musculoskeletal: She exhibits edema (bilateral 1+, worse on L; improving) and tenderness. She exhibits no deformity.   Bilateral legs bandaged; see photos in wound care note for ulcer picture   Neurological: She is alert and oriented to person, place, and time.   Skin: Skin is warm and dry. Rash noted. She is not diaphoretic. There is erythema (bilateral, blanching).   Psychiatric: She has a normal mood and affect. Her behavior is normal.   Vitals  reviewed.      Computed MELD-Na score unavailable. Necessary lab results were not found in the last year.  Computed MELD score unavailable. Necessary lab results were not found in the last year.    Significant Labs:  CBC:  Recent Labs   Lab 01/13/20 0434 01/14/20  0520   WBC 7.94 9.00   HGB 9.4* 9.7*   HCT 31.6* 30.9*    279     CMP:  Recent Labs   Lab 01/13/20 0434 01/14/20  0520    140   K 4.5 4.1    105   CO2 26 27    109   BUN 35* 36*   CREATININE 1.6* 1.6*   CALCIUM 8.9 8.9   ANIONGAP 7* 8   EGFRNONAA 27.6* 27.6*     PTINR:  No results for input(s): INR in the last 48 hours.    Significant Procedures:   Dobutamine Stress Test with Color Flow: No results found for this or any previous visit.    None      Assessment/Plan:      * Type 2 diabetes mellitus with hypoglycemia, without long-term current use of insulin  - A1c 5.9  - on glimepiride at home; suspect developed hypoglycemia in the setting of infection with MARY due to infection/ARB/NSAID use  - LDSSI with accuchecks qachs while in house  - long term would likely benefit from alternate medication with less risk of hypoglycemia    Cellulitis of right lower extremity  - bilateral blanching erythema with leukocytosis and venous stasis ulcer of RLE  - given 500cc NS in ED  - initiated on vancomycin and zosyn in ED; switch to cefepime for pseudomonas coverage; can likely de-escalate to cipro once ready for d/c  - BCx NGTD and leukocytosis improving  - Wound Care following  - lasix to decrease edema and aid healing of ulcer      MARY (acute kidney injury)  - OSH records indicate Cr 1.3 on 5/19 and 1.13 4/18; Cr 1.5 on admission with CrCl 21  - suspect due to infection + ARB + NSAID use + HTN vs baseline disease; less likely prerenal due to recent outpatient lasix initiation  - given 500cc NS in ED  - hold ARB and NSAIDs for now  - daily BMP      Delirium  - episodes of delirium with hallucinations  - delirium precautions:    1) During the  day, please open the shades and turn on the lights- If patient is in private room, please make sure they are close to the window.   2) Make sure the correct date and time is written on the whiteboard, as well as the current care team  3) Minimize interruptions at night-time, close shades and turn off TV. No vitals between 11PM and 5AM  4) When possible, during daytime make sure patient is in chair and provide activities that engage patient.  5) Please make sure patient has hearing aids and glasses while awake.    - prn haldol for agitation    Osteoarthritis of right knee        Acute diastolic heart failure  - had been started on lasix 20mg daily for bilateral lower extremity edema  - BNP very mildly elevated at 100  - TTE with normal LVEF, G1DD, ePAP 48 with CVP 8  - lasix 40mg IV bid -> lasix 40mg PO bid today  - strict I/Os, daily weights      HLD (hyperlipidemia)  - stable  - continue home simvastatin      Primary osteoarthritis involving multiple joints  - worst in bilateral knee joints; patient has been using wheelchair due to pain  - uses prn APAP with rare advil usage at home  - continue prn APAP and can titrate medication as necessary; avoiding systemic NSAIDs given renal disease but could consider capsaicin, diclofenac cream, lidocaine      DNR (do not resuscitate)  - code discussion with patient and family at bedside; patient is DNR      Debility  - 2/2 severe bilateral arthritis, worse on right  - PT/OT recommend SNF  - family interested in placement following hospitalization, will discuss with CM      Essential hypertension  - hypertensive on admission  - holding home ARB due to MARY  - likely driven by pain and MARY  - increased to amlodipine 10; would favor conservative treatment with pain control and infection treatment      Skin ulcer of right pretibial region limited to breakdown of skin  - concern for superimposed infection with pseudomonas  - Wound Care following  - APAP prn pain, can consider  escalation as warranted - would avoid systemic NSAID for now  - check KARINE        VTE Risk Mitigation (From admission, onward)         Ordered     Place sequential compression device  Until discontinued      01/11/20 2105     heparin (porcine) injection 5,000 Units  Every 12 hours      01/10/20 1318     IP VTE HIGH RISK PATIENT  Once      01/10/20 1318                      Javier Carrillo MD  Department of Hospital Medicine   [facility]

## 2020-01-14 NOTE — SUBJECTIVE & OBJECTIVE
Interval History: hallucinations improved. LE edema improving but still present, worse left than right. Discussed SNF placement and need to go directly from hospital to SNF rather than being able to stop at home first.    Review of Systems   Constitutional: Negative for fatigue and fever.   Respiratory: Negative for shortness of breath.    Cardiovascular: Positive for leg swelling. Negative for chest pain.   Gastrointestinal: Negative for nausea and vomiting.   Musculoskeletal: Positive for arthralgias.   Skin: Positive for rash and wound.   Neurological: Positive for weakness.   Psychiatric/Behavioral: Negative for hallucinations.     Objective:     Vital Signs (Most Recent):  Temp: 97.9 °F (36.6 °C) (01/14/20 1300)  Pulse: 80 (01/14/20 1300)  Resp: 14 (01/14/20 1300)  BP: (!) 146/66 (01/14/20 1300)  SpO2: 96 % (01/14/20 1300) Vital Signs (24h Range):  Temp:  [97.3 °F (36.3 °C)-98 °F (36.7 °C)] 97.9 °F (36.6 °C)  Pulse:  [55-93] 80  Resp:  [14-16] 14  SpO2:  [93 %-96 %] 96 %  BP: (139-168)/(65-73) 146/66     Weight: 62.1 kg (137 lb)  Body mass index is 22.11 kg/m².  No intake or output data in the 24 hours ending 01/14/20 1314   Physical Exam   Constitutional: She is oriented to person, place, and time. She appears well-developed and well-nourished. No distress.   Frail   HENT:   Head: Normocephalic and atraumatic.   Nose: Nose normal.   Eyes: Pupils are equal, round, and reactive to light. EOM are normal. No scleral icterus.   Neck: Normal range of motion. No JVD present. No tracheal deviation present.   Cardiovascular: Normal rate, regular rhythm and normal heart sounds.   Pulmonary/Chest: Effort normal and breath sounds normal. No respiratory distress.   Abdominal: Soft. She exhibits no distension. There is no tenderness.   Musculoskeletal: She exhibits edema (bilateral 1+, worse on L; improving) and tenderness. She exhibits no deformity.   Bilateral legs bandaged; see photos in wound care note for ulcer picture    Neurological: She is alert and oriented to person, place, and time.   Skin: Skin is warm and dry. Rash noted. She is not diaphoretic. There is erythema (bilateral, blanching).   Psychiatric: She has a normal mood and affect. Her behavior is normal.   Vitals reviewed.      Computed MELD-Na score unavailable. Necessary lab results were not found in the last year.  Computed MELD score unavailable. Necessary lab results were not found in the last year.    Significant Labs:  CBC:  Recent Labs   Lab 01/13/20  0434 01/14/20  0520   WBC 7.94 9.00   HGB 9.4* 9.7*   HCT 31.6* 30.9*    279     CMP:  Recent Labs   Lab 01/13/20 0434 01/14/20  0520    140   K 4.5 4.1    105   CO2 26 27    109   BUN 35* 36*   CREATININE 1.6* 1.6*   CALCIUM 8.9 8.9   ANIONGAP 7* 8   EGFRNONAA 27.6* 27.6*     PTINR:  No results for input(s): INR in the last 48 hours.    Significant Procedures:   Dobutamine Stress Test with Color Flow: No results found for this or any previous visit.    None

## 2020-01-14 NOTE — PHYSICIAN QUERY
PT Name: Debbie Dietz  MR #: [id number]     Physician Query Form - Documentation Clarification      CDS/: Julian Ha RN              Contact information:   [email or web address]    This form is a permanent document in the medical record.     Query Date: January 14, 2020    By submitting this query, we are merely seeking further clarification of documentation. Please utilize your independent clinical judgment when addressing the question(s) below.    The Medical record reflects the following:    Supporting Clinical Findings Location in Medical Record     Hypoglycemia :slurred speech, CBG was 59 at home, EMS gave D50 and CBG is now 129, slurred speech resolved....... was given D 50 IV by EMS and her speech has returned to normal....    Patient with slurred speech and confusion that resolved with administration of dextrose       1/10  ED provider note          1/10  ED provider note- attestation     ....reports hallucinations overnight and feeling that furniture was turning over....She associates them with taking the antibiotic......- episodes of delirium with hallucinations     ...hallucinations improved, although still present...Will switch abx.                                                               1/12   Progress note- Hospital medicine    1/13  Progress note- Hospital medicine      Labs                                                                               Doctor, Please specify diagnosis or diagnoses associated with above clinical findings.    Doctor, please specify neurological condition associated with the above clinical findings:     Provider Use Only      [  x ]  Metabolic encephalopathy associated with hypoglycemia and medications    [   ]  Other neurological condition/ please specify: ______________________    [   ]  Other/ please specify: ___________________________                                                                                                               [   ] Clinically Undetermined

## 2020-01-14 NOTE — PLAN OF CARE
Problem: Fall Injury Risk  Goal: Absence of Fall and Fall-Related Injury  Outcome: Ongoing, Progressing   No acute events during shift, pt is AAOx4 and VSS, refused to go down for ultrasound today bc she was hungry. Otherwise, she has been compliant with care. Plan of care followed as ordered. [facility]  Problem: Adult Inpatient Plan of Care  Goal: Plan of Care Review  Outcome: Ongoing, Progressing     Problem: Diabetes Comorbidity  Goal: Blood Glucose Level Within Desired Range  Outcome: Ongoing, Progressing     Problem: Skin Injury Risk Increased  Goal: Skin Health and Integrity  Outcome: Ongoing, Progressing

## 2020-01-14 NOTE — ASSESSMENT & PLAN NOTE
- concern for superimposed infection with pseudomonas  - Wound Care following  - APAP prn pain, can consider escalation as warranted - would avoid systemic NSAID for now  - check KARINE

## 2020-01-14 NOTE — ASSESSMENT & PLAN NOTE
- had been started on lasix 20mg daily for bilateral lower extremity edema  - BNP very mildly elevated at 100  - TTE with normal LVEF, G1DD, ePAP 48 with CVP 8  - lasix 40mg IV bid -> lasix 40mg PO bid today  - strict I/Os, daily weights

## 2020-01-14 NOTE — PLAN OF CARE
Problem: Physical Therapy Goal  Goal: Physical Therapy Goal  Description  PT goals until 1/20/20    1. Pt supine to sit with SBA-not met  2. Pt sit to supine with SBA-not met  3. Pt sit to stand with RW with SBA-not met  4. Pt to perform gait 15ft with RW with SBA.-not met  5. Pt to go up/down curb step with RW with CGA.-not met  6. Pt to transfer bed to/from bedside chair with RW with CGA.-not met  7. Pt to perform B LE exs in sitting or supine x 10 reps to strengthen B LE to improve functional mobility.-not met     Outcome: Ongoing, Progressing   Patient tolerated treatment well. Established POC and goals reviewed and remain appropriate. Plan is to continue to improve patient's functional mobility capabilities.      Awilda Garcia, PT, DPT  1/14/2020

## 2020-01-14 NOTE — PT/OT/SLP PROGRESS
"Occupational Therapy   Treatment    Name: Debbie Dietz  MRN: [id number]  Admitting Diagnosis:  Type 2 diabetes mellitus with hypoglycemia, without long-term current use of insulin       Recommendations:     Discharge Recommendations: nursing facility, skilled  Discharge Equipment Recommendations:  walker, rolling (Derek walker (4'4"-5'7"), bedside commode, shower chair  Barriers to discharge:  Decreased caregiver support, Inaccessible home environment(will needs supervision/24/7 assistance upon discharge. Patient lived alone and has 1 ETHEL)    Assessment:     Debbie Dietz is a 89 y.o. female with a medical diagnosis of Type 2 diabetes mellitus with hypoglycemia, without long-term current use of insulin. Performance deficits affecting function are weakness, impaired endurance, impaired self care skills, impaired functional mobilty, gait instability, impaired balance, impaired skin, decreased ROM, decreased lower extremity function, decreased upper extremity function. Spoke with daughter and patient about benefit of going to SNF versus returning home for safety due to living alone and needing more assistance at this time for basic self care needs. Patient is appropriate for SNF in order to maximize functional independence, reduce burden of care, and ensure safety once discharged from this hospitalization.     Rehab Prognosis:  Good; patient would benefit from acute skilled OT services to address these deficits and reach maximum level of function.       Plan:     Patient to be seen 3 x/week to address the above listed problems via self-care/home management, therapeutic activities, therapeutic exercises  · Plan of Care Expires: 02/12/20  · Plan of Care Reviewed with: patient, daughter    Subjective     Pain/Comfort:  · Pain Rating 1: 0/10  · Pain Rating Post-Intervention 1: 0/10    Objective:     Communicated with: ESTRELLA prior to session.  Patient found HOB elevated with Maria Elena, telemetry upon OT entry to " room.    General Precautions: Standard, fall   Orthopedic Precautions:N/A   Braces: N/A     Occupational Performance:     Bed Mobility:    · Patient completed Rolling/Turning to Right with contact guard assistance  · Patient completed Scooting/Bridging with contact guard assistance  · Patient completed Supine to Sit with contact guard assistance     Functional Mobility/Transfers:  · Patient completed Sit <> Stand Transfer with minimal assistance  with  rolling walker   · Patient completed Bed > Chair Transfer using Stand Pivot technique with moderate assistance with rolling walker (Patient needed verbal cues for safety and technique using RW)    Activities of Daily Living:  · Grooming: CGA combing hair sitting EOB  · Upper Body Dressing: moderate assistance Donning back gown  · Lower Body Dressing: moderate assistance [place] and donning socks EOB    AMPAC 6 Click ADL: 15    Treatment & Education:  Role of OT and POC  Safety  ADL retraining  Functional mobility training  Discharge planning    Patient left up in chair with call button in reach, daughter present and all needs met.  (nurse aware)Education:      GOALS:   Multidisciplinary Problems     Occupational Therapy Goals        Problem: Occupational Therapy Goal    Goal Priority Disciplines Outcome Interventions   Occupational Therapy Goal     OT, PT/OT Ongoing, Progressing    Description:  Goals to be met by:  1/27/2020    Patient will increase functional independence with ADLs by performing:    UE Dressing with Set-up Assistance.  LE Dressing with minimal assistance.  Toileting from bedside commode with Stand-by Assistance for hygiene and clothing management.   Supine to sit with Stand-by Assistance.  Stand pivot transfers with Stand-by Assistance.  Toilet transfer to bedside commode with Contact Guard Assistance.                       Time Tracking:     OT Date of Treatment: 01/14/20  OT Start Time: 1205  OT Stop Time: 1239  OT Total Time (min): 34  min    Billable Minutes:Self Care/Home Management 34    SAHARA Perez  1/14/2020

## 2020-01-14 NOTE — PT/OT/SLP PROGRESS
Physical Therapy Treatment    Patient Name:  Debbie Dietz   MRN:  [id number]    Recommendations:     Discharge Recommendations:  nursing facility, skilled   Discharge Equipment Recommendations: walker, rolling, bedside commode, shower chair   Barriers to discharge: Inaccessible home and Decreased caregiver support    Assessment:     Debbie Dietz is a 89 y.o. female admitted with a medical diagnosis of Type 2 diabetes mellitus with hypoglycemia, without long-term current use of insulin.  She presents with the following impairments/functional limitations:  weakness, impaired functional mobilty, impaired balance, decreased upper extremity function, decreased safety awareness, impaired skin, impaired endurance, impaired self care skills, gait instability. Patient tolerated assessment well. Detailed discussion with patient and dtr regarding benefits of SNF and importance of safety. Patient would continue to benefit from skilled PT services while in the hospital. At this time, upon d/c she is an appropriate candidate for SNF in order to progress towards an improved level of functional mobility independence.     Rehab Prognosis: Good; patient would benefit from acute skilled PT services to address these deficits and reach maximum level of function.    Recent Surgery: * No surgery found *      Plan:     During this hospitalization, patient to be seen 3 x/week to address the identified rehab impairments via gait training, therapeutic activities, therapeutic exercises, wheelchair management/training, neuromuscular re-education and progress toward the following goals:    · Plan of Care Expires:  02/11/20    Subjective     Chief Complaint: none  Patient/Family Comments/goals: to go home  Pain/Comfort:  · Pain Rating 1: 0/10  · Pain Rating Post-Intervention 1: 0/10      Objective:     Communicated with RN prior to session.  Patient found supine with [employer], telemetry upon PT entry to room.     General Precautions: Standard,  fall   Orthopedic Precautions:N/A   Braces: N/A     Functional Mobility:  · Bed Mobility:     · Rolling Right: contact guard assistance  · Scooting: contact guard assistance  · Supine to Sit: contact guard assistance  · Transfers:     · Bed to Chair: moderate assistance with  rolling walker  using  Step Transfer  · Gait: ~5 steps to bedside chair with ModA and RW; vc's to improve safety and safe sequencing with RW, FFP, unsteady, no LOB   · Balance: sitting EOB - SBA; static standing - ModA; dynamic standing - ModA       AM-PAC 6 CLICK MOBILITY  Turning over in bed (including adjusting bedclothes, sheets and blankets)?: 3  Sitting down on and standing up from a chair with arms (e.g., wheelchair, bedside commode, etc.): 3  Moving from lying on back to sitting on the side of the bed?: 3  Moving to and from a bed to a chair (including a wheelchair)?: 3  Need to walk in hospital room?: 3  Climbing 3-5 steps with a railing?: 2  Basic Mobility Total Score: 17       Therapeutic Activities and Exercises:  -Patient educated on the continued role and goal of PT  -Questions and concerns answered within the the PT scope of practice.   -White board updated in patient room to reflect level of assistance needed with nursing.     Patient left up in chair with all lines intact, call button in reach, RN notified and dtr present..    GOALS:   Multidisciplinary Problems     Physical Therapy Goals        Problem: Physical Therapy Goal    Goal Priority Disciplines Outcome Goal Variances Interventions   Physical Therapy Goal     PT, PT/OT Ongoing, Progressing     Description:  PT goals until 1/20/20    1. Pt supine to sit with SBA-not met  2. Pt sit to supine with SBA-not met  3. Pt sit to stand with RW with SBA-not met  4. Pt to perform gait 15ft with RW with SBA.-not met  5. Pt to go up/down curb step with RW with CGA.-not met  6. Pt to transfer bed to/from bedside chair with RW with CGA.-not met  7. Pt to perform B LE exs in sitting or  supine x 10 reps to strengthen B LE to improve functional mobility.-not met                      Time Tracking:     PT Received On: 01/14/20  PT Start Time: 1205     PT Stop Time: 1233  PT Total Time (min): 28 min     Billable Minutes: Gait Training 10 and Therapeutic Activity 18    Treatment Type: Treatment  PT/PTA: PT     PTA Visit Number: 0     Awilda Garcia, PT  01/14/2020

## 2020-01-14 NOTE — ASSESSMENT & PLAN NOTE
- hypertensive on admission  - holding home ARB due to MARY  - likely driven by pain and MARY  - increased to amlodipine 10; would favor conservative treatment with pain control and infection treatment

## 2020-01-14 NOTE — PLAN OF CARE
Problem: Occupational Therapy Goal  Goal: Occupational Therapy Goal  Description  Goals to be met by:  1/27/2020    Patient will increase functional independence with ADLs by performing:    UE Dressing with Set-up Assistance.  LE Dressing with minimal assistance.  Toileting from bedside commode with Stand-by Assistance for hygiene and clothing management.   Supine to sit with Stand-by Assistance.  Stand pivot transfers with Stand-by Assistance.  Toilet transfer to bedside commode with Contact Guard Assistance.      Outcome: Ongoing, Progressing   Patient's goals are appropriate.   SAHARA Perez  1/14/2020

## 2020-01-15 VITALS
BODY MASS INDEX: 22.02 KG/M2 | TEMPERATURE: 97 F | SYSTOLIC BLOOD PRESSURE: 143 MMHG | RESPIRATION RATE: 17 BRPM | HEIGHT: 66 IN | OXYGEN SATURATION: 94 % | WEIGHT: 137 LBS | DIASTOLIC BLOOD PRESSURE: 66 MMHG | HEART RATE: 70 BPM

## 2020-01-15 LAB
ANION GAP SERPL CALC-SCNC: 12 MMOL/L (ref 8–16)
BACTERIA BLD CULT: NORMAL
BACTERIA BLD CULT: NORMAL
BASOPHILS # BLD AUTO: 0.01 K/UL (ref 0–0.2)
BASOPHILS NFR BLD: 0.1 % (ref 0–1.9)
BUN SERPL-MCNC: 43 MG/DL (ref 10–30)
CALCIUM SERPL-MCNC: 9.7 MG/DL (ref 8.7–10.5)
CHLORIDE SERPL-SCNC: 102 MMOL/L (ref 95–110)
CO2 SERPL-SCNC: 24 MMOL/L (ref 23–29)
CREAT SERPL-MCNC: 1.8 MG/DL (ref 0.5–1.4)
DIFFERENTIAL METHOD: ABNORMAL
EOSINOPHIL # BLD AUTO: 0.2 K/UL (ref 0–0.5)
EOSINOPHIL NFR BLD: 2.4 % (ref 0–8)
ERYTHROCYTE [DISTWIDTH] IN BLOOD BY AUTOMATED COUNT: 15.5 % (ref 11.5–14.5)
EST. GFR  (AFRICAN AMERICAN): 27.6 ML/MIN/1.73 M^2
EST. GFR  (NON AFRICAN AMERICAN): 23.9 ML/MIN/1.73 M^2
GLUCOSE SERPL-MCNC: 118 MG/DL (ref 70–110)
HCT VFR BLD AUTO: 33.5 % (ref 37–48.5)
HGB BLD-MCNC: 10.4 G/DL (ref 12–16)
IMM GRANULOCYTES # BLD AUTO: 0.1 K/UL (ref 0–0.04)
IMM GRANULOCYTES NFR BLD AUTO: 1 % (ref 0–0.5)
LYMPHOCYTES # BLD AUTO: 1.4 K/UL (ref 1–4.8)
LYMPHOCYTES NFR BLD: 14.2 % (ref 18–48)
MAGNESIUM SERPL-MCNC: 2 MG/DL (ref 1.6–2.6)
MCH RBC QN AUTO: 31.3 PG (ref 27–31)
MCHC RBC AUTO-ENTMCNC: 31 G/DL (ref 32–36)
MCV RBC AUTO: 101 FL (ref 82–98)
MONOCYTES # BLD AUTO: 1 K/UL (ref 0.3–1)
MONOCYTES NFR BLD: 9.9 % (ref 4–15)
NEUTROPHILS # BLD AUTO: 7.3 K/UL (ref 1.8–7.7)
NEUTROPHILS NFR BLD: 72.4 % (ref 38–73)
NRBC BLD-RTO: 0 /100 WBC
PHOSPHATE SERPL-MCNC: 4.1 MG/DL (ref 2.7–4.5)
PLATELET # BLD AUTO: 324 K/UL (ref 150–350)
PMV BLD AUTO: 9.7 FL (ref 9.2–12.9)
POCT GLUCOSE: 124 MG/DL (ref 70–110)
POCT GLUCOSE: 185 MG/DL (ref 70–110)
POTASSIUM SERPL-SCNC: 4 MMOL/L (ref 3.5–5.1)
RBC # BLD AUTO: 3.32 M/UL (ref 4–5.4)
SODIUM SERPL-SCNC: 138 MMOL/L (ref 136–145)
WBC # BLD AUTO: 10.04 K/UL (ref 3.9–12.7)

## 2020-01-15 PROCEDURE — 85025 COMPLETE CBC W/AUTO DIFF WBC: CPT

## 2020-01-15 PROCEDURE — 99239 PR HOSPITAL DISCHARGE DAY,>30 MIN: ICD-10-PCS | Mod: ,,, | Performed by: INTERNAL MEDICINE

## 2020-01-15 PROCEDURE — 97530 THERAPEUTIC ACTIVITIES: CPT

## 2020-01-15 PROCEDURE — 25000003 PHARM REV CODE 250: Performed by: INTERNAL MEDICINE

## 2020-01-15 PROCEDURE — 25000003 PHARM REV CODE 250: Performed by: HOSPITALIST

## 2020-01-15 PROCEDURE — 80048 BASIC METABOLIC PNL TOTAL CA: CPT

## 2020-01-15 PROCEDURE — 99239 HOSP IP/OBS DSCHRG MGMT >30: CPT | Mod: ,,, | Performed by: INTERNAL MEDICINE

## 2020-01-15 PROCEDURE — 83735 ASSAY OF MAGNESIUM: CPT

## 2020-01-15 PROCEDURE — 63600175 PHARM REV CODE 636 W HCPCS: Performed by: HOSPITALIST

## 2020-01-15 PROCEDURE — 84100 ASSAY OF PHOSPHORUS: CPT

## 2020-01-15 PROCEDURE — 36415 COLL VENOUS BLD VENIPUNCTURE: CPT

## 2020-01-15 RX ORDER — POLYETHYLENE GLYCOL 3350 17 G/17G
17 POWDER, FOR SOLUTION ORAL DAILY
Status: DISCONTINUED | OUTPATIENT
Start: 2020-01-15 | End: 2020-01-15 | Stop reason: HOSPADM

## 2020-01-15 RX ORDER — AMOXICILLIN 250 MG
1 CAPSULE ORAL 2 TIMES DAILY PRN
Status: DISCONTINUED | OUTPATIENT
Start: 2020-01-15 | End: 2020-01-15 | Stop reason: HOSPADM

## 2020-01-15 RX ORDER — FUROSEMIDE 40 MG/1
40 TABLET ORAL 2 TIMES DAILY
Status: DISCONTINUED | OUTPATIENT
Start: 2020-01-16 | End: 2020-01-15 | Stop reason: HOSPADM

## 2020-01-15 RX ADMIN — AMLODIPINE BESYLATE 10 MG: 10 TABLET ORAL at 09:01

## 2020-01-15 RX ADMIN — HEPARIN SODIUM 5000 UNITS: 5000 INJECTION, SOLUTION INTRAVENOUS; SUBCUTANEOUS at 09:01

## 2020-01-15 RX ADMIN — POLYETHYLENE GLYCOL 3350 17 G: 17 POWDER, FOR SOLUTION ORAL at 12:01

## 2020-01-15 RX ADMIN — FUROSEMIDE 40 MG: 40 TABLET ORAL at 09:01

## 2020-01-15 RX ADMIN — CEFEPIME 1 G: 1 INJECTION, POWDER, FOR SOLUTION INTRAMUSCULAR; INTRAVENOUS at 12:01

## 2020-01-15 RX ADMIN — ACETAMINOPHEN 1000 MG: 500 TABLET ORAL at 05:01

## 2020-01-15 RX ADMIN — MICONAZOLE NITRATE: 20 OINTMENT TOPICAL at 09:01

## 2020-01-15 NOTE — PLAN OF CARE
01/15/20 0854   Post-Acute Status   Post-Acute Authorization Placement   Post-Acute Placement Status Referrals Sent     Sw uploaded SNF orders and PPD to [facility], faxed auth request to PHN.

## 2020-01-15 NOTE — DISCHARGE SUMMARY
[facility] Hospital Medicine  Discharge Summary      Patient Name: Debbie Dietz  MRN: [id number]  Admission Date: 1/10/2020  Hospital Length of Stay: 5 days  Discharge Date and Time:  01/15/2020 4:20 PM  Attending Physician: Naomy Bowman MD   Discharging Provider: Juan Mcpherson MD  Primary Care Provider: Yusuf Ewing MD    Hospital Medicine Team: [facility] HOSP MED D Juan Mcpherson MD    HPI:     Ms. Dietz is a 89yo woman with HTN, DM2 not on insulin, and LE ulcer who presents with episode of hypoglycemia and slurred speech. She states that she developed bilateral leg swelling and venous stasis ulcers shortly after Thanksgiving. She was set up with home health and wound care with some improvement. 2 weeks ago, she had increase in bilateral swelling and was started on PO lasix. She notes that over the past week, she has started to have bilateral redness to the legs, as well as increased pain of right LE ulcer. She notes that over the past week, she feels like her glucose has been dropping occasionally, with episodes of shaking and feeling fatigued, which improve with eating. On evening prior to admission, she was having increased right leg pain and took an advil to help her sleep. On morning of admit, she felt generalized weakness and had slurred speech. She was able to contact her daughter with phone by bedside and EMS was called. Glucose found to be 59; she was given D50 with complete resolution of symptoms.     In the ED, she was afebrile with HR 90 and /110. Found to have WBC 12.9 with K 5.7 and Cr 1.5.    * No surgery found *      Hospital Course:     Type 2 diabetes mellitus with hypoglycemia, without long-term current use of insulin  - A1c 5.9  - discontinued home glimepiride. Suspect developed hypoglycemia in the setting of infection with MARY  - LDSSI with accuchecks qachs while in house  - long term would likely benefit from alternate medication with less risk of  hypoglycemia     Cellulitis of right lower extremity  - bilateral blanching erythema with leukocytosis and venous stasis ulcer of RLE  - given 500cc NS in ED  - initiated on vancomycin and zosyn in ED --> cefepime for pseudomonas coverage --> cipro prior to discharge (end date- 1/26/20)  - BCx NGTD and leukocytosis improving  - Wound Care on board   - continue lasix to decrease edema and aid healing of ulcer        MARY (acute kidney injury)  - OSH records indicate Cr 1.3 on 5/19 and 1.13 4/18; Cr 1.5 on admission with CrCl 21  - suspect due to infection + ARB + NSAID use + HTN vs baseline disease  - less likely prerenal due to recent outpatient lasix initiation  - given 500cc NS in ED  - holding ARB for now and will need PCP followup to discuss when to resume   - daily BMP        Delirium  - episodes of delirium with hallucinations  - delirium precautions  - haldol prn          Acute diastolic heart failure  - on lasix 20mg daily for bilateral lower extremity edema  - BNP very mildly elevated at 100  - TTE with normal LVEF, G1DD, ePAP 48 with CVP 8  - lasix 40mg IV bid -> lasix 40mg PO BID  - strict I/Os, daily weights        HLD (hyperlipidemia)  - stable  - continue home simvastatin        Primary osteoarthritis involving multiple joints  - worst in bilateral knee joints; patient has been using wheelchair due to pain  - uses prn Tylenol with rare advil usage at home  - continue prn Tylenol and can titrate medication as necessary; avoiding systemic NSAIDs given renal disease but could consider capsaicin, diclofenac cream, lidocaine        DNR (do not resuscitate)  - code discussion with patient and family at bedside; patient is DNR        Debility  - 2/2 severe bilateral arthritis, worse on right  - PT/OT recommend SNF  - Patient stable for discharge to SNF today         Essential hypertension  - hypertensive on admission  - holding home ARB due to MARY  - likely driven by pain and MARY  - on amlodipine 10 mg daily          Skin ulcer of right pretibial region limited to breakdown of skin  - Wound Care on board   - Tyelnol prn pain, can consider escalation as warranted - would avoid systemic NSAID for now  - KARINE suggest minimal PAD       Consults:   Consults (From admission, onward)        Status Ordering Provider     Inpatient consult to Orthopedic Surgery  Once     Provider:  (Not yet assigned)    Completed SHEBA MULLINS          Final Active Diagnoses:    Diagnosis Date Noted POA    PRINCIPAL PROBLEM:  Type 2 diabetes mellitus with hypoglycemia, without long-term current use of insulin [E11.649] 01/10/2020 Yes    PAD (peripheral artery disease) [I73.9]  Unknown    Dermatitis associated with moisture [L30.8] 01/13/2020 Yes    Alteration in skin integrity [R23.9] 01/13/2020 Yes    Delirium [R41.0] 01/12/2020 Yes    Skin ulcer of right pretibial region limited to breakdown of skin [L97.811] 01/10/2020 Yes    Cellulitis of right lower extremity [L03.115] 01/10/2020 Yes    Essential hypertension [I10] 01/10/2020 Yes    MARY (acute kidney injury) [N17.9] 01/10/2020 Yes    Debility [R53.81] 01/10/2020 Yes    DNR (do not resuscitate) [Z66] 01/10/2020 Yes    Primary osteoarthritis involving multiple joints [M15.0] 01/10/2020 Yes    HLD (hyperlipidemia) [E78.5] 01/10/2020 Yes    Acute diastolic heart failure [I50.31] 01/10/2020 Yes    Osteoarthritis of right knee [M17.11] 01/10/2020 Yes      Problems Resolved During this Admission:    Diagnosis Date Noted Date Resolved POA    Hyperkalemia [E87.5] 01/10/2020 01/12/2020 Yes      Discharged Condition: good    Disposition: Nursing Facility    Follow Up:    Patient Instructions:      Ambulatory Referral to Cardiology   Referral Priority: Routine Referral Type: Consultation   Referral Reason: Specialty Services Required   Requested Specialty: Cardiology   Number of Visits Requested: 1     Ambulatory consult to Wound Clinic   Referral Priority: Routine Referral Type:  Consultation   Referral Reason: Specialty Services Required   Requested Specialty: Wound Care   Number of Visits Requested: 1     Medications:  Reconciled Home Medications:      Medication List      START taking these medications    acetaminophen 500 MG tablet  Commonly known as:  TYLENOL  Take 2 tablets (1,000 mg total) by mouth every 8 (eight) hours as needed.     amLODIPine 10 MG tablet  Commonly known as:  NORVASC  Take 1 tablet (10 mg total) by mouth once daily.     ciprofloxacin HCl 500 MG tablet  Commonly known as:  CIPRO  Take 1 tablet (500 mg total) by mouth once daily. End 1/26/20     furosemide 40 MG tablet  Commonly known as:  LASIX  Take 1 tablet (40 mg total) by mouth 2 (two) times daily.     haloperidol 1 MG tablet  Commonly known as:  HALDOL  Take 1 tablet (1 mg total) by mouth every 8 (eight) hours as needed.     insulin aspart U-100 100 unit/mL (3 mL) Inpn pen  Commonly known as:  NovoLOG  Inject 0-5 Units into the skin before meals and at bedtime as needed (Hyperglycemia).     miconazole nitrate 2% 2 % Oint  Commonly known as:  MICOTIN  Apply topically 2 (two) times daily. To perineal area        CHANGE how you take these medications    simvastatin 20 MG tablet  Commonly known as:  ZOCOR  Take 1 tablet (20 mg total) by mouth every evening.  What changed:    · medication strength  · how much to take  · when to take this        STOP taking these medications    CEPHALEXIN ORAL     GLIMEPIRIDE ORAL     IRBESARTAN ORAL     triamcinolone 0.5 % ointment  Commonly known as:  KENALOG            Significant Diagnostic Studies: Labs:   CMP   Recent Labs   Lab 01/14/20  0520 01/15/20  0553    138   K 4.1 4.0    102   CO2 27 24    118*   BUN 36* 43*   CREATININE 1.6* 1.8*   CALCIUM 8.9 9.7   ANIONGAP 8 12   ESTGFRAFRICA 31.8* 27.6*   EGFRNONAA 27.6* 23.9*   , CBC   Recent Labs   Lab 01/14/20  0520 01/15/20  0553   WBC 9.00 10.04   HGB 9.7* 10.4*   HCT 30.9* 33.5*    324   , Troponin  No results for input(s): TROPONINI in the last 168 hours. and A1C:   Recent Labs   Lab 01/10/20  1235   HGBA1C 5.9*       XR Right knee (1/10/20):  Impression       1. No acute displaced fracture or dislocation of the knee.     Xray Right tib-fib (1/10/20():  Impression       1. No convincing acute displaced fracture or dislocation of the tibia or fibula.       KARINE (1/15/20):  Impression  =========  Right Leg: Segmental pressures are unreliable due to medial calcinosis; however, PVR waveforms suggest minimal to mild peripheral arterial occlusive disease.  Left Leg: Segmental pressures are unreliable due to medial calcinosis; however, PVR waveforms suggest minimal peripheral arterial occlusive disease.    Pending Diagnostic Studies:     Procedure Component Value Units Date/Time    Basic Metabolic Panel (BMP) [[id number]] Collected:  01/15/20 0553    Order Status:  Sent Lab Status:  In process Updated:  01/15/20 0629    Specimen:  Blood     CBC with Automated Differential [[id number]] Collected:  01/15/20 0553    Order Status:  Sent Lab Status:  In process Updated:  01/15/20 0629    Specimen:  Blood     Magnesium [[id number]] Collected:  01/15/20 0553    Order Status:  Sent Lab Status:  In process Updated:  01/15/20 0629    Specimen:  Blood     Phosphorus [[id number]] Collected:  01/15/20 0553    Order Status:  Sent Lab Status:  In process Updated:  01/15/20 0629    Specimen:  Blood     VAS Ankle Brachial Indices Resting [[id number]]     Order Status:  Sent Lab Status:  No result         Indwelling Lines/Drains at time of discharge:   Lines/Drains/Airways     Drain            Female External Urinary Catheter 01/13/20 1945 1 day                Time spent on the discharge of patient: 45 minutes  Patient was seen and examined on the date of discharge and determined to be suitable for discharge.         Juan Mcpherson MD  Department of Hospital Medicine  [facility]

## 2020-01-15 NOTE — PLAN OF CARE
Nurse to call report to [facility] at , report to the maria luisa Mcintosh setup for 3pm, nurse aware.

## 2020-01-15 NOTE — PROGRESS NOTES
Wound care follow up for BLEs.    Dressings removed and wounds cleansed. Wounds appear improved. Periwound erythema improving. Wounds no longer odorous.     Wounds cleansed with Vashe and hydrofera blue ready applied and secured with kerlex.    Wound care to follow PRN.  Madai Rizo RN [facility]   [phone]

## 2020-01-15 NOTE — PT/OT/SLP PROGRESS
"Physical Therapy Treatment    Patient Name:  Debbie Dietz   MRN:  [id number]    Recommendations:     Discharge Recommendations:  nursing facility, skilled   Discharge Equipment Recommendations: walker, rolling, bedside commode, shower chair   Barriers to discharge: Decreased caregiver support and decreased functional mobility requiring increased assist      Assessment:     Debbie Dietz is a 89 y.o. female admitted with a medical diagnosis of Type 2 diabetes mellitus with hypoglycemia, without long-term current use of insulin.  She presents with the following impairments/functional limitations:  weakness, impaired self care skills, impaired balance, impaired endurance, impaired functional mobilty, pain, gait instability. Pt presenting up in chair upon arrival w/pt's primary c/o bilat knee pain. Pt additionally states "I haven't walked in 3yr, that's why I use the w/c and scoot w/my legs". Pt performed seated TherEx and standing to increase LE strength. Pt would benefit from continued skilled acute PT 3x/wk to improve functional mobility.  Recommending pt receive PT services in SNF setting following discharge from hospital once medically cleared.     Rehab Prognosis: Fair; patient would benefit from acute skilled PT services to address these deficits and reach maximum level of function.    Recent Surgery: * No surgery found *      Plan:     During this hospitalization, patient to be seen 3 x/week to address the identified rehab impairments via gait training, therapeutic activities, therapeutic exercises, neuromuscular re-education and progress toward the following goals:    · Plan of Care Expires:  02/11/20    Subjective     Chief Complaint: Bilat knee pain   Patient/Family Comments/goals:  "I haven't walked in 3yr, that's why I use the w/c and scoot w/my legs"    "see I can't pick my feet up"  Pain/Comfort:  · Pain Rating 1: (did not rate)  · Location - Side 1: Bilateral  · Location 1: knee      Objective: " "    Communicated with NSG prior to session.  Patient found up in chair with telemetry, Maria Elena upon PT entry to room.     General Precautions: Standard, fall   Orthopedic Precautions:N/A   Braces: N/A     Functional Mobility:  · Transfers:     · Sit to Stand:  moderate assistance with rolling walker  · Gait: unable pt stating "see I can't pick my feet up"  · Balance: Sitting: SBA    Standing: CGA      AM-PAC 6 CLICK MOBILITY  Turning over in bed (including adjusting bedclothes, sheets and blankets)?: 3  Sitting down on and standing up from a chair with arms (e.g., wheelchair, bedside commode, etc.): 2  Moving from lying on back to sitting on the side of the bed?: 3  Moving to and from a bed to a chair (including a wheelchair)?: 2  Need to walk in hospital room?: 2  Climbing 3-5 steps with a railing?: 1  Basic Mobility Total Score: 13       Therapeutic Activities and Exercises:   - Seated TherEx:    -Heel Raises: x15   - Dorsiflex toe raises: x15   -LAQ: x15   - Marches: x15  - Static Standing in RW: 4mins CGA w/RW  - Pt educated on:   -PT roles, expectations, and POC    -Safety with mobility   -Benefits of OOB activities to increase strength and functional mobility    -Performing ther ex for increasing LE ROM and strength   -Discharge recommendations     Patient left up in chair with all lines intact and call button in reach..    GOALS:   Multidisciplinary Problems     Physical Therapy Goals        Problem: Physical Therapy Goal    Goal Priority Disciplines Outcome Goal Variances Interventions   Physical Therapy Goal     PT, PT/OT Ongoing, Progressing     Description:  PT goals until 1/20/20    1. Pt supine to sit with SBA-not met  2. Pt sit to supine with SBA-not met  3. Pt sit to stand with RW with SBA-not met  4. Pt to perform gait 15ft with RW with SBA.-not met  5. Pt to go up/down curb step with RW with CGA.-not met  6. Pt to transfer bed to/from bedside chair with RW with CGA.-not met  7. Pt to perform B LE " exs in sitting or supine x 10 reps to strengthen B LE to improve functional mobility.-not met                      Time Tracking:     PT Received On: 01/15/20  PT Start Time: 0842     PT Stop Time: 0856  PT Total Time (min): 14 min     Billable Minutes: Therapeutic Activity 14    Treatment Type: Treatment  PT/PTA: PT     PTA Visit Number: 0     Steven Colin, PT  01/15/2020

## 2020-01-15 NOTE — PLAN OF CARE
Pt would benefit from continued skilled acute PT services to improve functional mobility. POC is to continue towards current goals set to progress towards PLOF.   Problem: Physical Therapy Goal  Goal: Physical Therapy Goal  Description  PT goals until 1/20/20    1. Pt supine to sit with SBA-not met  2. Pt sit to supine with SBA-not met  3. Pt sit to stand with RW with SBA-not met  4. Pt to perform gait 15ft with RW with SBA.-not met  5. Pt to go up/down curb step with RW with CGA.-not met  6. Pt to transfer bed to/from bedside chair with RW with CGA.-not met  7. Pt to perform B LE exs in sitting or supine x 10 reps to strengthen B LE to improve functional mobility.-not met     Outcome: Ongoing, Progressing

## 2020-01-15 NOTE — PLAN OF CARE
[facility]    FACILITY TRANSFER ORDERS      Patient Name: Debbie Dietz  YOB: 1926    PCP: Yusuf Ewing MD   PCP Address: [address] / [place] [address]  PCP Phone Number: [phone]  PCP Fax: [phone]    Encounter Date: 01/15/2020    Admit to: SNF, skilled bed    Vital Signs:  Routine    Diagnoses:   Active Hospital Problems    Diagnosis  POA    *Type 2 diabetes mellitus with hypoglycemia, without long-term current use of insulin [E11.649]  Yes    PAD (peripheral artery disease) [I73.9]  Unknown    Dermatitis associated with moisture [L30.8]  Yes    Alteration in skin integrity [R23.9]  Yes    Delirium [R41.0]  Yes    Skin ulcer of right pretibial region limited to breakdown of skin [L97.811]  Yes    Cellulitis of right lower extremity [L03.115]  Yes    Essential hypertension [I10]  Yes    MARY (acute kidney injury) [N17.9]  Yes    Debility [R53.81]  Yes    DNR (do not resuscitate) [Z66]  Yes    Primary osteoarthritis involving multiple joints [M15.0]  Yes    HLD (hyperlipidemia) [E78.5]  Yes    Acute diastolic heart failure [I50.31]  Yes    Osteoarthritis of right knee [M17.11]  Yes      Resolved Hospital Problems    Diagnosis Date Resolved POA    Hyperkalemia [E87.5] 01/12/2020 Yes       Allergies:  Review of patient's allergies indicates:   Allergen Reactions    Codeine Nausea Only    Darvon [propoxyphene] Nausea Only       Diet: diabetic diet: 2000 calorie    Activities: Activity as tolerated    Nursing:         - Fall precautions per nursing home protocoll   - Decubitus precautions:        -  for positioning   - Pressure reducing foam mattress   - Turn patient every two hours. Use wedge pillows to anchor patient       Labs: CBC and CMP weekly x2 weeks, then per facility protocol     CONSULTS:    Physical Therapy to evaluate and treat. , Occupational Therapy to evaluate and treat. and  to evaluate for community  resources/long-range planning.    MISCELLANEOUS CARE:  Routine Skin for Bedridden Patients: Apply moisture barrier cream to all skin folds and wet areas in perineal area daily and after baths and all bowel movements. and Diabetes Care:   SN to perform and educate Diabetic management with blood glucose monitoring:, Fingerstick blood sugar AC and HS and Report CBG < 60 or > 350 to physician.                                           Insulin Sliding Scale          Glucose  Novolog Insulin Subcutaneous        0 - 60   Orange juice or glucose tablet, hold insulin      No insulin   201-250  2 units   251-300  4 units   301-350  6 units   351-400  8 units   >400   10 units then call physician    WOUND CARE ORDERS  Yes:     Barrier antifungal cream BID to the perineal area  EHOB overlay  Heels offloaded  Hydrofera blue every other day to the leg wounds    Medications: Review discharge medications with patient and family and provide education.      Current Discharge Medication List      START taking these medications    Details   acetaminophen (TYLENOL) 500 MG tablet Take 2 tablets (1,000 mg total) by mouth every 8 (eight) hours as needed.  Refills: 0      amLODIPine (NORVASC) 10 MG tablet Take 1 tablet (10 mg total) by mouth once daily.  Qty: 30 tablet, Refills: 11      ciprofloxacin HCl (CIPRO) 500 MG tablet Take 1 tablet (500 mg total) by mouth once daily. End 1/26/20      furosemide (LASIX) 40 MG tablet Take 1 tablet (40 mg total) by mouth 2 (two) times daily.  Qty: 60 tablet, Refills: 11      haloperidol (HALDOL) 1 MG tablet Take 1 tablet (1 mg total) by mouth every 8 (eight) hours as needed.      insulin aspart U-100 (NOVOLOG) 100 unit/mL (3 mL) InPn pen Inject 0-5 Units into the skin before meals and at bedtime as needed (Hyperglycemia).  Refills: 0      miconazole nitrate 2% (MICOTIN) 2 % Oint Apply topically 2 (two) times daily. To perineal area  Refills: 0         CONTINUE these medications which have CHANGED     Details   simvastatin (ZOCOR) 20 MG tablet Take 1 tablet (20 mg total) by mouth every evening.  Qty: 90 tablet, Refills: 3         STOP taking these medications       CEPHALEXIN ORAL Comments:   Reason for Stopping:         GLIMEPIRIDE ORAL Comments:   Reason for Stopping:         IRBESARTAN ORAL Comments:   Reason for Stopping:         triamcinolone (KENALOG) 0.5 % ointment Comments:   Reason for Stopping:                    _________________________________  Juan Mcpherson MD  01/15/2020

## 2023-10-31 PROBLEM — H10.33 ACUTE BACTERIAL CONJUNCTIVITIS OF BOTH EYES: Status: ACTIVE | Noted: 2023-10-31

## 2023-10-31 PROBLEM — J18.9 PNEUMONIA: Status: ACTIVE | Noted: 2023-10-31

## 2023-10-31 PROBLEM — I21.4 NSTEMI (NON-ST ELEVATED MYOCARDIAL INFARCTION): Status: ACTIVE | Noted: 2023-10-31

## 2023-10-31 PROBLEM — Z71.89 ACP (ADVANCE CARE PLANNING): Status: ACTIVE | Noted: 2023-10-31

## 2023-10-31 PROBLEM — I87.8 CHRONIC VENOUS STASIS: Status: ACTIVE | Noted: 2023-10-31

## 2023-11-01 PROBLEM — R06.02 SHORTNESS OF BREATH: Status: ACTIVE | Noted: 2023-11-01

## 2023-11-01 PROBLEM — J20.6 ACUTE BRONCHITIS DUE TO RHINOVIRUS: Status: ACTIVE | Noted: 2023-11-01
